# Patient Record
Sex: FEMALE | Race: WHITE | NOT HISPANIC OR LATINO | Employment: OTHER | URBAN - METROPOLITAN AREA
[De-identification: names, ages, dates, MRNs, and addresses within clinical notes are randomized per-mention and may not be internally consistent; named-entity substitution may affect disease eponyms.]

---

## 2023-06-11 ENCOUNTER — APPOINTMENT (OUTPATIENT)
Dept: CARDIOLOGY | Facility: HOSPITAL | Age: 78
End: 2023-06-11
Payer: MEDICARE

## 2023-06-11 ENCOUNTER — APPOINTMENT (OUTPATIENT)
Dept: OTHER | Facility: HOSPITAL | Age: 78
End: 2023-06-11
Payer: MEDICARE

## 2023-06-11 ENCOUNTER — APPOINTMENT (OUTPATIENT)
Dept: GENERAL RADIOLOGY | Facility: HOSPITAL | Age: 78
End: 2023-06-11
Payer: MEDICARE

## 2023-06-11 ENCOUNTER — HOSPITAL ENCOUNTER (INPATIENT)
Facility: HOSPITAL | Age: 78
LOS: 3 days | Discharge: HOME | End: 2023-06-14
Attending: INTERNAL MEDICINE | Admitting: INTERNAL MEDICINE
Payer: MEDICARE

## 2023-06-11 ENCOUNTER — APPOINTMENT (OUTPATIENT)
Dept: CT IMAGING | Facility: HOSPITAL | Age: 78
End: 2023-06-11
Payer: MEDICARE

## 2023-06-11 DIAGNOSIS — I26.99 ACUTE MASSIVE PULMONARY EMBOLISM: ICD-10-CM

## 2023-06-11 DIAGNOSIS — I27.20 PULMONARY HYPERTENSION: Primary | ICD-10-CM

## 2023-06-11 PROBLEM — I10 ESSENTIAL HYPERTENSION: Status: ACTIVE | Noted: 2023-06-11

## 2023-06-11 PROBLEM — E03.9 HYPOTHYROIDISM (ACQUIRED): Status: ACTIVE | Noted: 2023-06-11

## 2023-06-11 PROBLEM — J45.909 ASTHMA: Status: ACTIVE | Noted: 2023-06-11

## 2023-06-11 LAB
ABO GROUP BLD: NORMAL
ABO GROUP BLD: NORMAL
ALBUMIN SERPL-MCNC: 3.4 G/DL (ref 3.5–5.2)
ALBUMIN/GLOB SERPL: 1.5 G/DL
ALP SERPL-CCNC: 64 U/L (ref 39–117)
ALT SERPL W P-5'-P-CCNC: 28 U/L (ref 1–33)
ANION GAP SERPL CALCULATED.3IONS-SCNC: 7 MMOL/L (ref 5–15)
APTT PPP: 47.6 SECONDS (ref 60–90)
ARTERIAL PATENCY WRIST A: ABNORMAL
AST SERPL-CCNC: 20 U/L (ref 1–32)
ATMOSPHERIC PRESS: ABNORMAL MM[HG]
BASE EXCESS BLDA CALC-SCNC: -0.3 MMOL/L (ref 0–2)
BASOPHILS # BLD AUTO: 0.04 10*3/MM3 (ref 0–0.2)
BASOPHILS NFR BLD AUTO: 0.4 % (ref 0–1.5)
BDY SITE: ABNORMAL
BH CV ECHO MEAS - AO MAX PG: 2.9 MMHG
BH CV ECHO MEAS - AO MEAN PG: 2 MMHG
BH CV ECHO MEAS - AO ROOT DIAM: 3.3 CM
BH CV ECHO MEAS - AO V2 MAX: 85.6 CM/SEC
BH CV ECHO MEAS - AO V2 VTI: 16.8 CM
BH CV ECHO MEAS - AVA(I,D): 2.8 CM2
BH CV ECHO MEAS - EDV(CUBED): 97.3 ML
BH CV ECHO MEAS - EDV(MOD-SP2): 60.2 ML
BH CV ECHO MEAS - EDV(MOD-SP4): 63.2 ML
BH CV ECHO MEAS - EF(MOD-BP): 50.1 %
BH CV ECHO MEAS - EF(MOD-SP2): 48.7 %
BH CV ECHO MEAS - EF(MOD-SP4): 50.6 %
BH CV ECHO MEAS - ESV(CUBED): 64 ML
BH CV ECHO MEAS - ESV(MOD-SP2): 30.9 ML
BH CV ECHO MEAS - ESV(MOD-SP4): 31.2 ML
BH CV ECHO MEAS - FS: 13 %
BH CV ECHO MEAS - IVS/LVPW: 0.73 CM
BH CV ECHO MEAS - IVSD: 0.8 CM
BH CV ECHO MEAS - LA DIMENSION: 2.8 CM
BH CV ECHO MEAS - LAT PEAK E' VEL: 6.2 CM/SEC
BH CV ECHO MEAS - LV MASS(C)D: 148.1 GRAMS
BH CV ECHO MEAS - LV MAX PG: 2.47 MMHG
BH CV ECHO MEAS - LV MEAN PG: 1 MMHG
BH CV ECHO MEAS - LV V1 MAX: 78.6 CM/SEC
BH CV ECHO MEAS - LV V1 VTI: 14.8 CM
BH CV ECHO MEAS - LVIDD: 4.6 CM
BH CV ECHO MEAS - LVIDS: 4 CM
BH CV ECHO MEAS - LVOT AREA: 3.1 CM2
BH CV ECHO MEAS - LVOT DIAM: 2 CM
BH CV ECHO MEAS - LVPWD: 1.1 CM
BH CV ECHO MEAS - MED PEAK E' VEL: 4.7 CM/SEC
BH CV ECHO MEAS - MV A MAX VEL: 80.7 CM/SEC
BH CV ECHO MEAS - MV DEC TIME: 0.23 MSEC
BH CV ECHO MEAS - MV E MAX VEL: 43.5 CM/SEC
BH CV ECHO MEAS - MV E/A: 0.54
BH CV ECHO MEAS - PA ACC TIME: 0.15 SEC
BH CV ECHO MEAS - PA V2 MAX: 77 CM/SEC
BH CV ECHO MEAS - RAP SYSTOLE: 8 MMHG
BH CV ECHO MEAS - RVSP: 58 MMHG
BH CV ECHO MEAS - SV(LVOT): 46.5 ML
BH CV ECHO MEAS - SV(MOD-SP2): 29.3 ML
BH CV ECHO MEAS - SV(MOD-SP4): 32 ML
BH CV ECHO MEAS - TAPSE (>1.6): 1.57 CM
BH CV ECHO MEAS - TR MAX PG: 50 MMHG
BH CV ECHO MEAS - TR MAX VEL: 353 CM/SEC
BH CV ECHO MEAS RV FREE WALL STRAIN: -18.6 %
BH CV ECHO MEASUREMENTS AVERAGE E/E' RATIO: 7.98
BH CV LOW VAS LEFT PERONEAL VESSEL: 1
BH CV LOW VAS RIGHT PERONEAL VESSEL: 1
BH CV LOWER VASCULAR LEFT COMMON FEMORAL AUGMENT: NORMAL
BH CV LOWER VASCULAR LEFT COMMON FEMORAL COMPRESS: NORMAL
BH CV LOWER VASCULAR LEFT COMMON FEMORAL PHASIC: NORMAL
BH CV LOWER VASCULAR LEFT COMMON FEMORAL SPONT: NORMAL
BH CV LOWER VASCULAR LEFT DISTAL FEMORAL COMPRESS: NORMAL
BH CV LOWER VASCULAR LEFT GASTRONEMIUS COMPRESS: NORMAL
BH CV LOWER VASCULAR LEFT GREATER SAPH AK COMPRESS: NORMAL
BH CV LOWER VASCULAR LEFT GREATER SAPH BK COMPRESS: NORMAL
BH CV LOWER VASCULAR LEFT LESSER SAPH COMPRESS: NORMAL
BH CV LOWER VASCULAR LEFT MID FEMORAL AUGMENT: NORMAL
BH CV LOWER VASCULAR LEFT MID FEMORAL COMPRESS: NORMAL
BH CV LOWER VASCULAR LEFT MID FEMORAL PHASIC: NORMAL
BH CV LOWER VASCULAR LEFT MID FEMORAL SPONT: NORMAL
BH CV LOWER VASCULAR LEFT PERONEAL COMPRESS: NORMAL
BH CV LOWER VASCULAR LEFT PERONEAL THROMBUS: NORMAL
BH CV LOWER VASCULAR LEFT POPLITEAL AUGMENT: NORMAL
BH CV LOWER VASCULAR LEFT POPLITEAL COMPRESS: NORMAL
BH CV LOWER VASCULAR LEFT POPLITEAL PHASIC: NORMAL
BH CV LOWER VASCULAR LEFT POPLITEAL SPONT: NORMAL
BH CV LOWER VASCULAR LEFT POSTERIOR TIBIAL COMPRESS: NORMAL
BH CV LOWER VASCULAR LEFT PROFUNDA FEMORAL COMPRESS: NORMAL
BH CV LOWER VASCULAR LEFT PROXIMAL FEMORAL COMPRESS: NORMAL
BH CV LOWER VASCULAR LEFT SAPHENOFEMORAL JUNCTION COMPRESS: NORMAL
BH CV LOWER VASCULAR RIGHT COMMON FEMORAL AUGMENT: NORMAL
BH CV LOWER VASCULAR RIGHT COMMON FEMORAL COMPRESS: NORMAL
BH CV LOWER VASCULAR RIGHT COMMON FEMORAL PHASIC: NORMAL
BH CV LOWER VASCULAR RIGHT COMMON FEMORAL SPONT: NORMAL
BH CV LOWER VASCULAR RIGHT DISTAL FEMORAL COMPRESS: NORMAL
BH CV LOWER VASCULAR RIGHT GASTRONEMIUS COMPRESS: NORMAL
BH CV LOWER VASCULAR RIGHT GREATER SAPH AK COMPRESS: NORMAL
BH CV LOWER VASCULAR RIGHT GREATER SAPH BK COMPRESS: NORMAL
BH CV LOWER VASCULAR RIGHT LESSER SAPH COMPRESS: NORMAL
BH CV LOWER VASCULAR RIGHT MID FEMORAL AUGMENT: NORMAL
BH CV LOWER VASCULAR RIGHT MID FEMORAL COMPRESS: NORMAL
BH CV LOWER VASCULAR RIGHT MID FEMORAL PHASIC: NORMAL
BH CV LOWER VASCULAR RIGHT MID FEMORAL SPONT: NORMAL
BH CV LOWER VASCULAR RIGHT PERONEAL COMPRESS: NORMAL
BH CV LOWER VASCULAR RIGHT PERONEAL THROMBUS: NORMAL
BH CV LOWER VASCULAR RIGHT POPLITEAL AUGMENT: NORMAL
BH CV LOWER VASCULAR RIGHT POPLITEAL COMPRESS: NORMAL
BH CV LOWER VASCULAR RIGHT POPLITEAL PHASIC: NORMAL
BH CV LOWER VASCULAR RIGHT POPLITEAL SPONT: NORMAL
BH CV LOWER VASCULAR RIGHT POSTERIOR TIBIAL COMPRESS: NORMAL
BH CV LOWER VASCULAR RIGHT PROFUNDA FEMORAL COMPRESS: NORMAL
BH CV LOWER VASCULAR RIGHT PROXIMAL FEMORAL COMPRESS: NORMAL
BH CV LOWER VASCULAR RIGHT SAPHENOFEMORAL JUNCTION COMPRESS: NORMAL
BH CV VAS PRELIMINARY FINDINGS SCRIPTING: 1
BH CV XLRA - RV BASE: 4.6 CM
BH CV XLRA - RV LENGTH: 5.3 CM
BH CV XLRA - RV MID: 3.6 CM
BH CV XLRA - TDI S': 7.4 CM/SEC
BILIRUB SERPL-MCNC: 0.3 MG/DL (ref 0–1.2)
BLD GP AB SCN SERPL QL: NEGATIVE
BODY TEMPERATURE: 37 C
BUN SERPL-MCNC: 21 MG/DL (ref 8–23)
BUN/CREAT SERPL: 34.4 (ref 7–25)
CALCIUM SPEC-SCNC: 8.3 MG/DL (ref 8.6–10.5)
CHLORIDE SERPL-SCNC: 112 MMOL/L (ref 98–107)
CO2 BLDA-SCNC: 26.1 MMOL/L (ref 22–33)
CO2 SERPL-SCNC: 26 MMOL/L (ref 22–29)
COHGB MFR BLD: 1.1 % (ref 0–2)
CREAT SERPL-MCNC: 0.61 MG/DL (ref 0.57–1)
D-LACTATE SERPL-SCNC: 1.2 MMOL/L (ref 0.5–2)
DEPRECATED RDW RBC AUTO: 43 FL (ref 37–54)
EGFRCR SERPLBLD CKD-EPI 2021: 91.6 ML/MIN/1.73
EOSINOPHIL # BLD AUTO: 0.13 10*3/MM3 (ref 0–0.4)
EOSINOPHIL NFR BLD AUTO: 1.4 % (ref 0.3–6.2)
EPAP: 0
ERYTHROCYTE [DISTWIDTH] IN BLOOD BY AUTOMATED COUNT: 12.9 % (ref 12.3–15.4)
GEN 5 2HR TROPONIN T REFLEX: 17 NG/L
GLOBULIN UR ELPH-MCNC: 2.3 GM/DL
GLUCOSE BLDC GLUCOMTR-MCNC: 129 MG/DL (ref 70–130)
GLUCOSE BLDC GLUCOMTR-MCNC: 95 MG/DL (ref 70–130)
GLUCOSE SERPL-MCNC: 101 MG/DL (ref 65–99)
HBA1C MFR BLD: 6 % (ref 4.8–5.6)
HCO3 BLDA-SCNC: 24.8 MMOL/L (ref 20–26)
HCT VFR BLD AUTO: 36.1 % (ref 34–46.6)
HCT VFR BLD CALC: 36.2 % (ref 38–51)
HGB BLD-MCNC: 11.2 G/DL (ref 12–15.9)
HGB BLDA-MCNC: 11.8 G/DL (ref 14–18)
IMM GRANULOCYTES # BLD AUTO: 0.02 10*3/MM3 (ref 0–0.05)
IMM GRANULOCYTES NFR BLD AUTO: 0.2 % (ref 0–0.5)
INHALED O2 CONCENTRATION: 32 %
INR PPP: 1.29 (ref 0.89–1.12)
IPAP: 0
LEFT ATRIUM VOLUME INDEX: 28.3 ML/M2
LYMPHOCYTES # BLD AUTO: 2.59 10*3/MM3 (ref 0.7–3.1)
LYMPHOCYTES NFR BLD AUTO: 27.7 % (ref 19.6–45.3)
MAGNESIUM SERPL-MCNC: 1.9 MG/DL (ref 1.6–2.4)
MCH RBC QN AUTO: 28.5 PG (ref 26.6–33)
MCHC RBC AUTO-ENTMCNC: 31 G/DL (ref 31.5–35.7)
MCV RBC AUTO: 91.9 FL (ref 79–97)
METHGB BLD QL: 0.4 % (ref 0–1.5)
MODALITY: ABNORMAL
MONOCYTES # BLD AUTO: 0.77 10*3/MM3 (ref 0.1–0.9)
MONOCYTES NFR BLD AUTO: 8.2 % (ref 5–12)
NEUTROPHILS NFR BLD AUTO: 5.8 10*3/MM3 (ref 1.7–7)
NEUTROPHILS NFR BLD AUTO: 62.1 % (ref 42.7–76)
NOTE: ABNORMAL
NRBC BLD AUTO-RTO: 0 /100 WBC (ref 0–0.2)
OXYHGB MFR BLDV: 95.3 % (ref 94–99)
PAW @ PEAK INSP FLOW SETTING VENT: 0 CMH2O
PCO2 BLDA: 41.4 MM HG (ref 35–45)
PCO2 TEMP ADJ BLD: 41.4 MM HG (ref 35–45)
PH BLDA: 7.38 PH UNITS (ref 7.35–7.45)
PH, TEMP CORRECTED: 7.38 PH UNITS
PHOSPHATE SERPL-MCNC: 3.3 MG/DL (ref 2.5–4.5)
PLATELET # BLD AUTO: 186 10*3/MM3 (ref 140–450)
PMV BLD AUTO: 10 FL (ref 6–12)
PO2 BLDA: 84.1 MM HG (ref 83–108)
PO2 TEMP ADJ BLD: 84.1 MM HG (ref 83–108)
POTASSIUM SERPL-SCNC: 3.9 MMOL/L (ref 3.5–5.2)
PROT SERPL-MCNC: 5.7 G/DL (ref 6–8.5)
PROTHROMBIN TIME: 16.2 SECONDS (ref 12.2–14.5)
RBC # BLD AUTO: 3.93 10*6/MM3 (ref 3.77–5.28)
RH BLD: POSITIVE
RH BLD: POSITIVE
SODIUM SERPL-SCNC: 145 MMOL/L (ref 136–145)
T&S EXPIRATION DATE: NORMAL
TOTAL RATE: 0 BREATHS/MINUTE
TROPONIN T DELTA: -2 NG/L
TROPONIN T SERPL HS-MCNC: 19 NG/L
TSH SERPL DL<=0.05 MIU/L-ACNC: 2.34 UIU/ML (ref 0.27–4.2)
UFH PPP CHRO-ACNC: 0.26 IU/ML (ref 0.3–0.7)
UFH PPP CHRO-ACNC: 0.26 IU/ML (ref 0.3–0.7)
WBC NRBC COR # BLD: 9.35 10*3/MM3 (ref 3.4–10.8)

## 2023-06-11 PROCEDURE — 85025 COMPLETE CBC W/AUTO DIFF WBC: CPT | Performed by: INTERNAL MEDICINE

## 2023-06-11 PROCEDURE — 84484 ASSAY OF TROPONIN QUANT: CPT | Performed by: INTERNAL MEDICINE

## 2023-06-11 PROCEDURE — 80053 COMPREHEN METABOLIC PANEL: CPT | Performed by: INTERNAL MEDICINE

## 2023-06-11 PROCEDURE — 85520 HEPARIN ASSAY: CPT | Performed by: INTERNAL MEDICINE

## 2023-06-11 PROCEDURE — 93970 EXTREMITY STUDY: CPT

## 2023-06-11 PROCEDURE — 0 MAGNESIUM SULFATE 4 GM/100ML SOLUTION: Performed by: INTERNAL MEDICINE

## 2023-06-11 PROCEDURE — 93005 ELECTROCARDIOGRAM TRACING: CPT | Performed by: INTERNAL MEDICINE

## 2023-06-11 PROCEDURE — 36600 WITHDRAWAL OF ARTERIAL BLOOD: CPT

## 2023-06-11 PROCEDURE — 83050 HGB METHEMOGLOBIN QUAN: CPT

## 2023-06-11 PROCEDURE — 82948 REAGENT STRIP/BLOOD GLUCOSE: CPT

## 2023-06-11 PROCEDURE — 83605 ASSAY OF LACTIC ACID: CPT | Performed by: INTERNAL MEDICINE

## 2023-06-11 PROCEDURE — 83735 ASSAY OF MAGNESIUM: CPT | Performed by: INTERNAL MEDICINE

## 2023-06-11 PROCEDURE — 86901 BLOOD TYPING SEROLOGIC RH(D): CPT

## 2023-06-11 PROCEDURE — 84100 ASSAY OF PHOSPHORUS: CPT | Performed by: INTERNAL MEDICINE

## 2023-06-11 PROCEDURE — 86900 BLOOD TYPING SEROLOGIC ABO: CPT | Performed by: INTERNAL MEDICINE

## 2023-06-11 PROCEDURE — 82805 BLOOD GASES W/O2 SATURATION: CPT

## 2023-06-11 PROCEDURE — 93970 EXTREMITY STUDY: CPT | Performed by: INTERNAL MEDICINE

## 2023-06-11 PROCEDURE — 71045 X-RAY EXAM CHEST 1 VIEW: CPT

## 2023-06-11 PROCEDURE — 99223 1ST HOSP IP/OBS HIGH 75: CPT | Performed by: INTERNAL MEDICINE

## 2023-06-11 PROCEDURE — 82375 ASSAY CARBOXYHB QUANT: CPT

## 2023-06-11 PROCEDURE — 84443 ASSAY THYROID STIM HORMONE: CPT | Performed by: INTERNAL MEDICINE

## 2023-06-11 PROCEDURE — 86901 BLOOD TYPING SEROLOGIC RH(D): CPT | Performed by: INTERNAL MEDICINE

## 2023-06-11 PROCEDURE — 85520 HEPARIN ASSAY: CPT

## 2023-06-11 PROCEDURE — 86850 RBC ANTIBODY SCREEN: CPT | Performed by: INTERNAL MEDICINE

## 2023-06-11 PROCEDURE — 85610 PROTHROMBIN TIME: CPT | Performed by: INTERNAL MEDICINE

## 2023-06-11 PROCEDURE — 93306 TTE W/DOPPLER COMPLETE: CPT

## 2023-06-11 PROCEDURE — 83036 HEMOGLOBIN GLYCOSYLATED A1C: CPT | Performed by: INTERNAL MEDICINE

## 2023-06-11 PROCEDURE — 85730 THROMBOPLASTIN TIME PARTIAL: CPT | Performed by: INTERNAL MEDICINE

## 2023-06-11 PROCEDURE — 86900 BLOOD TYPING SEROLOGIC ABO: CPT

## 2023-06-11 PROCEDURE — 25010000002 HEPARIN (PORCINE) 25000-0.45 UT/250ML-% SOLUTION: Performed by: INTERNAL MEDICINE

## 2023-06-11 RX ORDER — SODIUM CHLORIDE 9 MG/ML
40 INJECTION, SOLUTION INTRAVENOUS AS NEEDED
Status: DISCONTINUED | OUTPATIENT
Start: 2023-06-11 | End: 2023-06-12

## 2023-06-11 RX ORDER — SODIUM CHLORIDE 0.9 % (FLUSH) 0.9 %
10 SYRINGE (ML) INJECTION AS NEEDED
Status: DISCONTINUED | OUTPATIENT
Start: 2023-06-11 | End: 2023-06-14

## 2023-06-11 RX ORDER — BISACODYL 5 MG/1
5 TABLET, DELAYED RELEASE ORAL DAILY PRN
Status: DISCONTINUED | OUTPATIENT
Start: 2023-06-11 | End: 2023-06-12

## 2023-06-11 RX ORDER — ATORVASTATIN CALCIUM 40 MG/1
40 TABLET, FILM COATED ORAL NIGHTLY
COMMUNITY

## 2023-06-11 RX ORDER — PAROXETINE 10 MG/1
10 TABLET, FILM COATED ORAL DAILY
Status: DISCONTINUED | OUTPATIENT
Start: 2023-06-12 | End: 2023-06-15 | Stop reason: HOSPADM

## 2023-06-11 RX ORDER — HEPARIN SODIUM 10000 [USP'U]/100ML
12 INJECTION, SOLUTION INTRAVENOUS
Status: DISCONTINUED | OUTPATIENT
Start: 2023-06-11 | End: 2023-06-12

## 2023-06-11 RX ORDER — ONDANSETRON 2 MG/ML
4 INJECTION INTRAMUSCULAR; INTRAVENOUS EVERY 6 HOURS PRN
Status: DISCONTINUED | OUTPATIENT
Start: 2023-06-11 | End: 2023-06-14

## 2023-06-11 RX ORDER — NICOTINE POLACRILEX 4 MG
15 LOZENGE BUCCAL
Status: DISCONTINUED | OUTPATIENT
Start: 2023-06-11 | End: 2023-06-12

## 2023-06-11 RX ORDER — PAROXETINE 10 MG/1
10 TABLET, FILM COATED ORAL EVERY MORNING
COMMUNITY

## 2023-06-11 RX ORDER — SODIUM CHLORIDE 0.9 % (FLUSH) 0.9 %
10 SYRINGE (ML) INJECTION EVERY 12 HOURS SCHEDULED
Status: DISCONTINUED | OUTPATIENT
Start: 2023-06-11 | End: 2023-06-14

## 2023-06-11 RX ORDER — ATORVASTATIN CALCIUM 40 MG/1
40 TABLET, FILM COATED ORAL NIGHTLY
Status: DISCONTINUED | OUTPATIENT
Start: 2023-06-11 | End: 2023-06-15 | Stop reason: HOSPADM

## 2023-06-11 RX ORDER — ERGOCALCIFEROL (VITAMIN D2) 10 MCG
400 TABLET ORAL DAILY
COMMUNITY

## 2023-06-11 RX ORDER — MAGNESIUM SULFATE HEPTAHYDRATE 40 MG/ML
4 INJECTION, SOLUTION INTRAVENOUS ONCE
Status: COMPLETED | OUTPATIENT
Start: 2023-06-11 | End: 2023-06-11

## 2023-06-11 RX ORDER — DOCUSATE SODIUM 250 MG
250 CAPSULE ORAL DAILY
COMMUNITY

## 2023-06-11 RX ORDER — TRAZODONE HYDROCHLORIDE 50 MG/1
50 TABLET ORAL NIGHTLY
COMMUNITY

## 2023-06-11 RX ORDER — LEVOTHYROXINE SODIUM 0.1 MG/1
100 TABLET ORAL
Status: DISCONTINUED | OUTPATIENT
Start: 2023-06-12 | End: 2023-06-15 | Stop reason: HOSPADM

## 2023-06-11 RX ORDER — POLYETHYLENE GLYCOL 3350 17 G/17G
17 POWDER, FOR SOLUTION ORAL DAILY PRN
Status: DISCONTINUED | OUTPATIENT
Start: 2023-06-11 | End: 2023-06-12

## 2023-06-11 RX ORDER — DEXTROSE MONOHYDRATE 25 G/50ML
10-50 INJECTION, SOLUTION INTRAVENOUS
Status: DISCONTINUED | OUTPATIENT
Start: 2023-06-11 | End: 2023-06-12

## 2023-06-11 RX ORDER — AMOXICILLIN 250 MG
2 CAPSULE ORAL 2 TIMES DAILY
Status: DISCONTINUED | OUTPATIENT
Start: 2023-06-11 | End: 2023-06-13

## 2023-06-11 RX ORDER — TRAZODONE HYDROCHLORIDE 50 MG/1
25 TABLET ORAL NIGHTLY
Status: DISCONTINUED | OUTPATIENT
Start: 2023-06-11 | End: 2023-06-14

## 2023-06-11 RX ORDER — BISACODYL 10 MG
10 SUPPOSITORY, RECTAL RECTAL DAILY PRN
Status: DISCONTINUED | OUTPATIENT
Start: 2023-06-11 | End: 2023-06-12

## 2023-06-11 RX ORDER — INSULIN LISPRO 100 [IU]/ML
1-200 INJECTION, SOLUTION INTRAVENOUS; SUBCUTANEOUS
Status: DISCONTINUED | OUTPATIENT
Start: 2023-06-11 | End: 2023-06-12

## 2023-06-11 RX ORDER — LEVOTHYROXINE SODIUM 0.1 MG/1
100 TABLET ORAL
COMMUNITY

## 2023-06-11 RX ORDER — NITROGLYCERIN 0.4 MG/1
0.4 TABLET SUBLINGUAL
Status: DISCONTINUED | OUTPATIENT
Start: 2023-06-11 | End: 2023-06-12

## 2023-06-11 RX ORDER — INSULIN LISPRO 100 [IU]/ML
1-200 INJECTION, SOLUTION INTRAVENOUS; SUBCUTANEOUS AS NEEDED
Status: DISCONTINUED | OUTPATIENT
Start: 2023-06-11 | End: 2023-06-12

## 2023-06-11 RX ORDER — METFORMIN HYDROCHLORIDE 500 MG/1
500 TABLET, EXTENDED RELEASE ORAL
COMMUNITY

## 2023-06-11 RX ORDER — IBUPROFEN 600 MG/1
1 TABLET ORAL
Status: DISCONTINUED | OUTPATIENT
Start: 2023-06-11 | End: 2023-06-12

## 2023-06-11 RX ORDER — NAPROXEN SODIUM 220 MG
220 TABLET ORAL 2 TIMES DAILY PRN
COMMUNITY
End: 2023-06-14 | Stop reason: HOSPADM

## 2023-06-11 RX ADMIN — TRAZODONE HYDROCHLORIDE 25 MG: 50 TABLET ORAL at 21:53

## 2023-06-11 RX ADMIN — MAGNESIUM SULFATE HEPTAHYDRATE 4 G: 40 INJECTION, SOLUTION INTRAVENOUS at 15:43

## 2023-06-11 RX ADMIN — ATORVASTATIN CALCIUM 40 MG: 40 TABLET, FILM COATED ORAL at 21:52

## 2023-06-11 RX ADMIN — HEPARIN SODIUM 9 UNITS/KG/HR: 10000 INJECTION, SOLUTION INTRAVENOUS at 13:28

## 2023-06-11 RX ADMIN — Medication 10 ML: at 13:44

## 2023-06-11 RX ADMIN — SENNOSIDES AND DOCUSATE SODIUM 2 TABLET: 50; 8.6 TABLET ORAL at 20:10

## 2023-06-11 RX ADMIN — Medication 10 ML: at 20:10

## 2023-06-11 NOTE — PLAN OF CARE
Goal Outcome Evaluation:                      *admitted from [facility]  *heparin gtt infusing  *labs redrawn and sent   *Cards consulted  VSS, on 3L NC, afebrile   *c/o rib cage soreness  *family not present, on way

## 2023-06-11 NOTE — H&P
Intensive Care Admission Note     Acute massive pulmonary embolism    History of Present Illness     This a very nice 77-year-old patient with history of hypertension, hypothyroidism, and possible underlying asthma who had been in her usual state of health and is actually visiting [place] from [place] who was witnessed to have a syncopal episode yesterday by family.  It is unclear if she actually lost pulse or not however she did receive approximately 1 round of chest compressions from her family.  She regained consciousness and was brought to [facility] in [place], [place],.  She was found to have large bilateral pulmonary emboli without saddle formation and concern for right heart strain.  Our facility was consulted for transfer for possible endovascular thrombolytic versus thromboembolectomy.  The patient has been treated with heparin.  She arrives to our facility with normal blood pressure and pulse currently.  She is requiring only several liters of nasal cannula oxygen.  She currently denies any chest pain and feels only mildly short of breath.  She denies any abdominal discomfort.  She has denied any recent lower extremity edema or swelling.  She denies visual change, headache, or focal weakness.  She states that she has never had a history of blood clots in the past nor does she know of any in the family.  She has been on a long road trip recently from [place].  There have been no medication changes recently.  She denies any trauma.  She does state that she is a little sore in her right flank and she feels that this is from the chest compressions she received yesterday.    Problem List, Surgical History, Family, Social History, and ROS     Acute massive pulmonary embolism    Essential hypertension    Hypothyroidism (acquired)    Asthma     Past Surgical History:   Procedure Laterality Date    BLADDER SUSPENSION      x2    CHOLECYSTECTOMY      HERNIA REPAIR      HYSTERECTOMY      TUBAL ABDOMINAL  "LIGATION         No Known Allergies  No current facility-administered medications on file prior to encounter.     Current Outpatient Medications on File Prior to Encounter   Medication Sig    atorvastatin (LIPITOR) 40 MG tablet Take 1 tablet by mouth Every Night.    levothyroxine (SYNTHROID, LEVOTHROID) 100 MCG tablet Take 1 tablet by mouth Every Morning.    metFORMIN ER (GLUCOPHAGE-XR) 500 MG 24 hr tablet Take 1 tablet by mouth Daily With Breakfast.    PARoxetine (PAXIL) 10 MG tablet Take 1 tablet by mouth Every Morning.    traZODone (DESYREL) 50 MG tablet Take 1 tablet by mouth Every Night.    docusate sodium (COLACE) 250 MG capsule Take 1 capsule by mouth Daily.    naproxen sodium (ALEVE) 220 MG tablet Take 1 tablet by mouth 2 (Two) Times a Day As Needed.    Vitamin D, Cholecalciferol, (CHOLECALCIFEROL) 10 MCG (400 UNIT) tablet Take 1 tablet by mouth Daily.     MEDICATION LIST AND ALLERGIES REVIEWED.    History reviewed. No pertinent family history.  Social History     Tobacco Use    Smoking status: Never   Vaping Use    Vaping Use: Never used   Substance Use Topics    Alcohol use: Never    Drug use: Never         Review of Systems  A full review of systems has been completed and it is negative except as mentioned expressly in the HPI.    Physical Exam and Clinical Information   /97   Pulse 82   Temp 98.5 °F (36.9 °C) (Oral)   Resp 20   Ht 156.2 cm (61.5\")   Wt 76.8 kg (169 lb 5 oz)   SpO2 96%   BMI 31.47 kg/m²   Physical Exam  Constitutional:       General: She is not in acute distress.     Appearance: Normal appearance.   HENT:      Head: Normocephalic and atraumatic.      Mouth/Throat:      Mouth: Mucous membranes are moist.   Eyes:      Extraocular Movements: Extraocular movements intact.      Pupils: Pupils are equal, round, and reactive to light.   Neck:      Vascular: No carotid bruit.   Cardiovascular:      Rate and Rhythm: Normal rate and regular rhythm.      Pulses: Normal pulses.      " Heart sounds: Normal heart sounds. No murmur heard.  Pulmonary:      Effort: Pulmonary effort is normal. No respiratory distress.      Breath sounds: Normal breath sounds.   Abdominal:      General: Abdomen is flat. Bowel sounds are normal. There is no distension.   Musculoskeletal:         General: Normal range of motion.      Cervical back: Normal range of motion and neck supple. No tenderness.      Comments: There may be mild left greater than right ankle edema but otherwise no lower extremity edema is appreciated.  Pulses are intact.   Skin:     General: Skin is warm.      Capillary Refill: Capillary refill takes less than 2 seconds.      Coloration: Skin is not jaundiced or pale.      Findings: No bruising, erythema, lesion or rash.   Neurological:      General: No focal deficit present.      Mental Status: She is alert and oriented to person, place, and time.       Results from last 7 days   Lab Units 06/11/23  1322   WBC 10*3/mm3 9.35   HEMOGLOBIN g/dL 11.2*   PLATELETS 10*3/mm3 186     Results from last 7 days   Lab Units 06/11/23  1322 06/10/23  0923   SODIUM mmol/L 145  --    POTASSIUM mmol/L 3.9  --    CO2 mmol/L 26.0  --    BUN mg/dL 21  --    CREATININE mg/dL 0.61  --    MAGNESIUM mg/dL 1.9 1.7   PHOSPHORUS mg/dL 3.3  --    GLUCOSE mg/dL 101*  --      Estimated Creatinine Clearance: 72.1 mL/min (by C-G formula based on SCr of 0.61 mg/dL).  Results from last 7 days   Lab Units 06/11/23  1322   HEMOGLOBIN A1C % 6.00*     Results from last 7 days   Lab Units 06/11/23  1336   PH, ARTERIAL pH units 7.385   PCO2, ARTERIAL mm Hg 41.4   PO2 ART mm Hg 84.1     Lab Results   Component Value Date    LACTATE 1.2 06/11/2023          I reviewed the patient's results and images.     Impression     Acute massive pulmonary embolism    Essential hypertension    Hypothyroidism (acquired)    Asthma       Plan/Recommendations     The patient will be admitted to the intensive care unit for close monitoring.  Currently  hemodynamically stable.  Cardiology to consult for consideration of thromboembolectomy versus thrombolytic therapy.  Lower extremity duplex ultrasound as well as echocardiogram have been ordered.  I suspect that the patient may have had a provoked embolus from DVT from prolonged travel recently though she does not have a history in the past nor is there history in her family.  We will run a hypercoagulability panel.  Continue with heparin for now.    She will remain a full code.    High level of risk due to:  drug(s) requiring intensive monitoring for toxicity and decision regarding hospitalization or escalation of level of hospital care.        Shay Brooks MD, [facility]  Pulmonary and Critical Care Medicine  06/11/23 14:49 EDT     CC: Provider, No Known

## 2023-06-11 NOTE — NURSING NOTE
"  ACC REVIEW REPORT: [facility] [place] [facility]        PATIENT NAME: Cristal Reddy    PATIENT ID: [id number]        COVID-19 ACC SCREENING       DOES THE PATIENT HAVE A FEVER GREATER THAN OR EQUAL .4: NO    IS THE PATIENT EXPERIENCING SHORTNESS OF BREATH: YES    DOES THE PATIENT HAVE A COUGH: NO  DOES THE PATIENT HAVE ANY OF THE FOLLOWING RISK FACTORS:    EXPOSURE TO SUSPECTED OR KNOWN COVID-19: NO    RECENT TRAVEL HISTORY TO ENDEMIC AREA (DOMESTIC/LOCAL): NO    IS THE PATIENT A HEALTHCARE WORKER: NO    HAS THE PATIENT EXPERIENCED A LOSS OF SENSE OF TASTE OR SMELL: NO    HAS THE PATIENT BEEN TESTED FOR COVID-19: YES    DATE TESTED: 6/10  NEGATIVE           BED:  N234     BED TYPE: ICU     BED GIVEN TO: ANNMARIE VU RN IN ED     TIME BED GIVEN: 0400    TODAY'S DATE: 6/11/2023    TRANSFER DATE: 6/11    ETA: UNK,: PENDING TRANSPORTATION     TRANSFERRING FACILITY: [facility]     TRANSFERRING FACILITY PHONE # : [phone]    TRANSFERRING MD: MINOO IN ED     DATE/TIME REQUEST RECEIVED: 6/11 0015    [facility] RN: COLBY HAWKINSN, CCRN, CSC     REPORT FROM: ANNMARIE VU RN     TIME REPORT TAKEN: 0400    DIAGNOSIS: ANGEL PE WITH RIGHT HEART STRAIN     REASON FOR TRANSFER TO [facility]: HIGHER LEVEL OF CARE, POSS THROMBOLYTIC THERAPY    TRANSPORTATION: GROUND     CLINICAL REASON FOR TRANSFER TO [facility]: PT IS FROM [place], CAME TO [place] [facility] FOR [place].  PRIOR TO CEREMONY, PT HAD SYNCOPAL EPISODE WITNESSED BY FAMILY WHO STARTED CPR, (GOT COMPRESSIONS FOR COUPLE MINS PER FAMILY)  PT REGAINED CONSCIOUSNESS BY THE TIME EMS ARRIVED.     ED DOC DISCUSSED CASE W DR. GARCIA WHO ACCEPTED PT.        CLINICAL INFORMATION    HEIGHT: 61\"    WEIGHT: 77.1KG    ALLERGIES: NKDA     INFECTIOUS DISEASE: NONE      VITAL SIGNS:    TIME: 0300  TEMP: AFEB   PULSE: 74 NSR   B/P: 99/60  (HAS BEEN IN 90'S THE 19HRS SHE'S BEEN IN ED)   RESP: 18 W SATS 94 ON 3L /  NURSE REPORTS SHE GETS WINDED WALKING TO BATHROOM         LAB INFORMATION: WBC 11.5 /  " "CR 0.74 /  TROP X 3 = 58 / 76/  62 /  PLTS 202 /  H&H NORMAL /  K+ 3.4 (REPLACED ORALLY)                                        BNP 4000 /   /  D-DIMER 6.08        MEDS/IV FLUIDS:   20G IN RT AC AND 20G IN LEFT HAND; / HEPARIN GTT AT 9U/KG/HR, PTT IS DUE AT 6AM /  ATIVAN 1MG PO /                                      ORAL KCL 40MEQ.        CARDIAC SYSTEM:    CHEST PAIN:  ON ARRIVAL TO ED BUT RESOLVED     RHYTHM: NSR     Is patient taking or has patient been given any drugs that could increase bleeding? NO    HEART CATH:  NO         RESPIRATORY SYSTEM:    LUNG SOUNDS: DIMINISHED IN ANGEL BASES, NO RALES     ABG RESULTS:  REPORTED AS NORMAL     OXYGEN: 3L/NC     O2 SAT: 94     IMAGING FINDINGS:  LARGE ANGEL PE (SENDING DISC W IMAGING)       RESPIRATORY STATUS:  GETS \"WINDED W EXERTION\"        CNS/MUSCULOSKELETAL    ALERT AND ORIENTED:      RENE COMA SCALE:    E: 4  M: 6  V: 5      CNS/MUSCULOSKELETAL NOTES: WALKED TO BATHROOM W ASST 1,  NORMALLY INDEPENDENT       GI//GY      ABDOMINAL PAIN: DENIES        GI//GY NOTES: VOIDED W/O DIFF       PAST MEDICAL HISTORY: ASTHMA / HLD /  HYPOTHRYROID /  SURGERY HX= MELECIO , HERNIA REPAIR, HYSTERECTOMY       ADDITIONAL NOTES: FAMILY WILL BE COMING, THEY ARE TRAVELLING IN RV.            Raiza Dumas RN  6/11/2023  04:13 EDT  "

## 2023-06-11 NOTE — CONSULTS
Cardiology Consult    Cristal Reddy  1945  [phone]  There is no work phone number on file.    06/11/23    DATE OF ADMISSION: 6/11/2023  [facility] 2A ICU    Provider, No Known  [facility] / [facility] [id number]  Referring Provider: Art Boyle,*     Reason for Consultation: bilateral PE    Problem List:  Bilateral PE  Recent prolonged car ride traveling, with several week history of shortness of breath and 2 episodes of syncope in the last week  CT chest 6/10/2023: Moderate to large bilateral pulmonary embolism with evidence of right heart strain-[facility]  Hypothyroidism  Hyperlipidemia  Pre-diabetes  Anxiety/depression      History reviewed. No pertinent past medical history.    History of Present Illness:   Cristal Reddy is a 77-year-old female with history of hyperlipidemia, prediabetes, and hypothyroidism who cardiology is asked to evaluate today for need for EKOS due to new diagnosis of bilateral PE with evidence of right heart strain on CT chest.  The patient was transferred to [facility] earlier today from [facility] where she presented there yesterday after a syncopal spell at home and a several week history of worsening progressive shortness of breath.  CT of the chest revealed a moderate to large bilateral pulmonary embolism with evidence of right heart strain.  She was noted to be hypoxic, and she was placed on a heparin drip and oxygen via nasal cannula prior to transfer.  The patient tells me that she recently traveled from [place] back to her home in [place] 6 weeks ago and had noticed over the past several weeks that she had become short of breath with doing her normal activities to the point that she had to sit down and rest several times.  It had began to worsen and last week, she had an episode of syncope while doing things around her house. She became dizzy, then passed out, urinating on herself. Her neighbor found her, and  she recovered to feeling normal after about an hour. She did not seek medical treatment. She traveled to [place], [place] a few days ago for her grandson's wedding, and yesterday morning, around 7:30 AM, she got up to walk to the bathroom, felt dizzy, and passed out again. Reportedly, she was not responding and her grand-daughter could not find a pulse. She started CPR for about 15 compressions, and gave two breaths before the patient awoke. EMS brought her to [facility] ED. Currently, she is having a LE duplex performed. Echo done just a few minutes ago. She is on a heparin gtt. She is on 2.5 L O2 with O2 sat 97%. No CP.       No Known Allergies    Prior to Admission Medications       Prescriptions Last Dose Informant Patient Reported? Taking?    atorvastatin (LIPITOR) 40 MG tablet 6/10/2023  Yes Yes    Take 1 tablet by mouth Every Night.    levothyroxine (SYNTHROID, LEVOTHROID) 100 MCG tablet 6/10/2023 Self Yes Yes    Take 1 tablet by mouth Every Morning.    metFORMIN ER (GLUCOPHAGE-XR) 500 MG 24 hr tablet 6/10/2023  Yes Yes    Take 1 tablet by mouth Daily With Breakfast.    PARoxetine (PAXIL) 10 MG tablet 6/10/2023  Yes Yes    Take 1 tablet by mouth Every Morning.    traZODone (DESYREL) 50 MG tablet 6/10/2023  Yes Yes    Take 1 tablet by mouth Every Night.    docusate sodium (COLACE) 250 MG capsule   Yes No    Take 1 capsule by mouth Daily.    naproxen sodium (ALEVE) 220 MG tablet   Yes No    Take 1 tablet by mouth 2 (Two) Times a Day As Needed.    Vitamin D, Cholecalciferol, (CHOLECALCIFEROL) 10 MCG (400 UNIT) tablet   Yes No    Take 1 tablet by mouth Daily.              Current Facility-Administered Medications:     sennosides-docusate (PERICOLACE) 8.6-50 MG per tablet 2 tablet, 2 tablet, Oral, BID **AND** polyethylene glycol (MIRALAX) packet 17 g, 17 g, Oral, Daily PRN **AND** bisacodyl (DULCOLAX) EC tablet 5 mg, 5 mg, Oral, Daily PRN **AND** bisacodyl (DULCOLAX) suppository 10 mg, 10 mg, Rectal, Daily PRN,  Shay Brooks MD    Calcium Replacement - Follow Nurse / BPA Driven Protocol, , Does not apply, PRCecilia MENA Matthew M, MD    heparin 42064 units/250 mL (100 units/mL) in 0.45 % NaCl infusion, 10 Units/kg/hr, Intravenous, Titrated, Roddy Wyatt PharmD, Last Rate: 7.68 mL/hr at 06/11/23 1448, 10 Units/kg/hr at 06/11/23 1448    Magnesium Cardiology Dose Replacement - Follow Nurse / BPA Driven Protocol, , Does not apply, PRCecilia MENA Matthew M, MD    magnesium sulfate 4g/100mL (PREMIX) infusion, 4 g, Intravenous, Once, Shay Brooks MD    nitroglycerin (NITROSTAT) SL tablet 0.4 mg, 0.4 mg, Sublingual, Q5 Min PRN, Shay Brooks MD    ondansetron (ZOFRAN) injection 4 mg, 4 mg, Intravenous, Q6H PRN, Shya Brooks MD    Pharmacy to Dose Heparin, , Does not apply, Continuous PRNCecilia Matthew M, MD    Phosphorus Replacement - Follow Nurse / BPA Driven Protocol, , Does not apply, Cecilia ALY Matthew M, MD    Potassium Replacement - Follow Nurse / BPA Driven Protocol, , Does not apply, Cecilia ALY Matthew M, MD    sodium chloride 0.9 % flush 10 mL, 10 mL, Intravenous, Q12H, Shay Brooks MD, 10 mL at 06/11/23 1344    sodium chloride 0.9 % flush 10 mL, 10 mL, Intravenous, PRNCecilia Matthew M, MD    sodium chloride 0.9 % infusion 40 mL, 40 mL, Intravenous, PRN, Shay Brooks MD    Social History     Socioeconomic History    Marital status:    Tobacco Use    Smoking status: Never   Vaping Use    Vaping Use: Never used   Substance and Sexual Activity    Alcohol use: Never    Drug use: Never    Sexual activity: Defer       History reviewed. No pertinent family history.    REVIEW OF SYSTEMS:   CONSTITUTIONAL:         No weight loss, fever, chills, + weakness + fatigue.   HEENT:                            No visual loss, blurred vision, double vision, yellow sclerae.                                             No hearing loss, congestion, sore throat.   SKIN:  "                               No rashes, urticaria, ulcers, sores.     RESPIRATORY:               No shortness of breath, hemoptysis, cough, sputum.   GI:                                     No anorexia, nausea, vomiting, diarrhea. No abdominal pain, melena.   :                                   No burning on urination, hematuria or increased frequency.  ENDOCRINE:                   No diaphoresis, cold or heat intolerance. No polyuria or polydipsia.   NEURO:                            No headache, dizziness, +syncope, - paralysis, ataxia, or parasthesias.                                            No change in bowel or bladder control. No history of CVA/TIA  MUSCULOSKELETAL:    No muscle, back pain, joint pain or stiffness.   HEMATOLOGY:               No anemia, bleeding, bruising. No history of DVT/PE.  PSYCH:                            No history of depression, anxiety    Vitals:    06/11/23 1300 06/11/23 1315 06/11/23 1345 06/11/23 1400   BP: 144/97 121/90 142/83 143/97   BP Location: Left arm      Patient Position: Lying      Pulse: 87 85 83 82   Resp: 20      Temp: 98.5 °F (36.9 °C)      TempSrc: Oral      SpO2: 97% 98% 97% 96%   Weight: 76.8 kg (169 lb 5 oz)      Height: 156.2 cm (61.5\")            Vital Sign Min/Max for last 24 hours  Temp  Min: 98.5 °F (36.9 °C)  Max: 98.5 °F (36.9 °C)   BP  Min: 121/90  Max: 144/97   Pulse  Min: 82  Max: 87   Resp  Min: 20  Max: 20   SpO2  Min: 96 %  Max: 98 %   Flow (L/min)  Min: 3  Max: 3    No intake or output data in the 24 hours ending 06/11/23 1455          Physical Exam:  GEN: Well nourished, Well- developed  No acute distress  HEENT: Normocephalic, Atraumatic, PERRLA, moist mucous membranes  NECK: supple, NO JVD, no thyromegaly, no lymphadenopathy  CARDIAC: S1S2  RRR no murmur, gallop, rub  LUNGS: Clear to ausculation, normal respiratory effort  ABDOMEN: Soft, nontender, normal bowel sounds  EXTREMITIES:No gross deformities,  No clubbing, cyanosis, or " edema  SKIN: Warm, dry  NEURO: No focal deficits  PSYCHIATRIC: Normal affect and mood      I personally viewed and interpreted the patient's EKG/Telemetry/lab data    Data:   Results from last 7 days   Lab Units 06/11/23  1322   WBC 10*3/mm3 9.35   HEMOGLOBIN g/dL 11.2*   HEMATOCRIT % 36.1   PLATELETS 10*3/mm3 186     Results from last 7 days   Lab Units 06/11/23  1322   SODIUM mmol/L 145   POTASSIUM mmol/L 3.9   CHLORIDE mmol/L 112*   CO2 mmol/L 26.0   BUN mg/dL 21   CREATININE mg/dL 0.61   GLUCOSE mg/dL 101*      Results from last 7 days   Lab Units 06/11/23  1322   HEMOGLOBIN A1C % 6.00*     Results from last 7 days   Lab Units 06/11/23  1322   TSH uIU/mL 2.340         Results from last 7 days   Lab Units 06/11/23  1309 06/10/23  2230 06/10/23  0923   PROTIME Seconds 16.2*  --  12.3   INR  1.29*  --  1.15   APTT seconds 47.6*   < > 21.3*    < > = values in this interval not displayed.     Results from last 7 days   Lab Units 06/11/23  1322   HSTROP T ng/L 19*             No intake or output data in the 24 hours ending 06/11/23 1455    Chest X-Ray:  Imaging Results (Last 24 Hours)       Procedure Component Value Units Date/Time    CT Outside Films [[id number]] Resulted: 06/11/23 1348     Updated: 06/11/23 1348    Narrative:      This procedure was auto-finalized with no dictation required.    XR Chest 1 View [[id number]] Collected: 06/11/23 1331     Updated: 06/11/23 1337    Narrative:        XR CHEST 1 VW    Date of Exam: 6/11/2023 1:17 PM EDT    Indication: Bilateral pulmonary emboli with findings of right heart strain    Comparison: Outside CT pulmonary angiogram report dated Sidra 10, 2023    FINDINGS:  There appear to be mild opacities in the left base. No other significant infiltrate is seen.  No pneumothorax or significant pleural effusion.  Heart size appears at the upper limits of normal. Mediastinal contour appears grossly normal. There is   atherosclerosis of the aortic arch.      Impression:      1.The  heart appears borderline enlarged.  2.Mild left basilar opacities which could represent mild atelectasis, infiltrate, or developing infarct.      Electronically Signed: Shay Serra    6/11/2023 1:34 PM EDT    Workstation ID: [id number]            Telemetry: NSR   EKG: pending         Assessment and Plan:     Bilateral PE:  CT chest from [facility] shows moderate to large bilateral pulmonary embolism with evidence of right heart strain.  We will follow-up on echocardiogram performed earlier today for further assessment of right heart strain and discussed with interventionalists if EKOS is needed.  Continue heparin drip      Electronically signed by FLORENCE sTai, 06/11/23, 2:55 PM EDT.    History and physical examination verified.  Agree with PAs note.    77-year-old white female with a history of hypothyroidism, hyperlipidemia, prediabetes, and anxiety depression who subsequently was admitted as a transfer from outside hospital secondary to bilateral pulmonary embolism.  We are asked to see the patient for potential treatment of her bilateral PEs.  The patient was in her usual state of health until approximately 6 weeks ago.  At that time she had driven in a car for approximately 22 hours as she went from [place] to [place].  Subsequently after that car ride, she had been noticing increasing shortness of breath while doing her normal activities.  Multiple times she had to sit down and relax in order to resume activities of daily living.  The episodes of shortness of breath occur start to occur at rest and progressed.  She suffered an syncopal episode last week while doing activities of daily living where she became dizzy and passed out and urinated on herself.  She was found by her neighbor and subsequently recovered without presenting to the emergency room.  She subsequently traveled again in a long car ride from [facility] to see her grandson got .  Yesterday a.m. around 7:30 in the  morning she got up to walk to the bathroom again feeling dizzy and passed out again.  She did undergo CPR for approximately 15 compressions with 2 breaths before awakening.  She was then subsequently transferred to [facility] ER.  In the ER, she was found to have a bilateral large PE burden and subsequently transferred here for further care.  Presently she complains of some chest discomfort with inspiration due to her CPR but no ongoing shortness of breath at rest.  No near-syncope or syncope.  No dizziness.    Family history, social history, and review of systems per PAs note.    Physical Exam blood pressure is 143/97 pulse is 80 respiratory rate is 20 temperature is 98.5 O2 saturation on 2 L 96%.  Constitutional: oriented to person, place, and time.  well-developed and well-nourished. No distress.   HENT: Normocephalic.   Eyes: Conjunctivae are normal. No scleral icterus.   Neck: Normal carotid pulses, no hepatojugular reflux and no JVD present. Carotid bruit is not present. No tracheal deviation, no edema and no erythema present. No thyromegaly present.   Cardiovascular: Regular rate and rhythm normal S1 and S2 there is an S4 no murmurs. PMI NL  Pulses: equal and symmetrical.   Pulmonary/Chest: Clear to Auscultation bilaterally with no rales or wheezes. Resp effort NL  Abdominal: Soft. Bowel sounds are normal. no distension and no mass. There is no hepatosplenomegaly. There is no tenderness. There is no rebound and no guarding. No aortic pulsations.  Musculoskeletal:  exhibits no edema, tenderness or deformity.   Neurological: is alert and oriented to person, place, and time.  Rest is nonfocal.    Skin: Skin is warm and dry. No rash noted. No diaphoretic. No cyanosis or erythema. No pallor. Nails show no clubbing.   Psychiatric: Normal mood and affect.Speech is normal and behavior is normal.    Laboratory data as per PAs note.  Troponin level 19.    Twelve-lead EKG shows normal sinus rhythm 80 bpm normal  HI, QRS and QT intervals.  No RV strain pattern and no S1Q3T3 changes.    Echocardiogram presently pending.    Impression/plan:    Bilateral pulmonary emboli resulting in syncopal episode x2 in a patient with prolonged immobility during very long car rides.  Heart rate, blood pressure, O2 saturations are all stable with no significant abnormalities noted.  Await echocardiogram evaluation of RV strain.  Agree with intravenous heparin for now.  Also agree with Doppler ultrasounds of her lower extremities for further assessment.  Would keep on bedrest due to the large clot burden.  Discussed case with intensivist and interventional cardiologist on-call.  Best plan at this point would be intravenous heparin and no IV tPA or interventional procedures.    I, Ranjan Yeboah MD, personally performed the services face to face as described and documented by the above named individual. I have made any necessary edits and it is both accurate and complete 6/11/2023  15:49 EDT

## 2023-06-12 LAB
ALBUMIN SERPL-MCNC: 3.1 G/DL (ref 3.5–5.2)
ALBUMIN/GLOB SERPL: 1.3 G/DL
ALP SERPL-CCNC: 62 U/L (ref 39–117)
ALT SERPL W P-5'-P-CCNC: 25 U/L (ref 1–33)
ANION GAP SERPL CALCULATED.3IONS-SCNC: 6 MMOL/L (ref 5–15)
AST SERPL-CCNC: 16 U/L (ref 1–32)
BASOPHILS # BLD AUTO: 0.03 10*3/MM3 (ref 0–0.2)
BASOPHILS NFR BLD AUTO: 0.4 % (ref 0–1.5)
BILIRUB SERPL-MCNC: 0.4 MG/DL (ref 0–1.2)
BUN SERPL-MCNC: 20 MG/DL (ref 8–23)
BUN/CREAT SERPL: 37.7 (ref 7–25)
CALCIUM SPEC-SCNC: 8.1 MG/DL (ref 8.6–10.5)
CHLORIDE SERPL-SCNC: 111 MMOL/L (ref 98–107)
CO2 SERPL-SCNC: 25 MMOL/L (ref 22–29)
CREAT SERPL-MCNC: 0.53 MG/DL (ref 0.57–1)
D DIMER PPP FEU-MCNC: 0.81 MCGFEU/ML (ref 0–0.78)
DEPRECATED RDW RBC AUTO: 40.3 FL (ref 37–54)
EGFRCR SERPLBLD CKD-EPI 2021: 94.8 ML/MIN/1.73
EOSINOPHIL # BLD AUTO: 0.14 10*3/MM3 (ref 0–0.4)
EOSINOPHIL NFR BLD AUTO: 1.7 % (ref 0.3–6.2)
ERYTHROCYTE [DISTWIDTH] IN BLOOD BY AUTOMATED COUNT: 12.5 % (ref 12.3–15.4)
F5 GENE MUT ANL BLD/T: NORMAL
FACTOR II, DNA ANALYSIS: NORMAL
GLOBULIN UR ELPH-MCNC: 2.3 GM/DL
GLUCOSE BLDC GLUCOMTR-MCNC: 101 MG/DL (ref 70–130)
GLUCOSE BLDC GLUCOMTR-MCNC: 110 MG/DL (ref 70–130)
GLUCOSE BLDC GLUCOMTR-MCNC: 126 MG/DL (ref 70–130)
GLUCOSE BLDC GLUCOMTR-MCNC: 147 MG/DL (ref 70–130)
GLUCOSE SERPL-MCNC: 99 MG/DL (ref 65–99)
HCT VFR BLD AUTO: 33.1 % (ref 34–46.6)
HCYS SERPL-MCNC: 7.8 UMOL/L (ref 0–15)
HGB BLD-MCNC: 10.7 G/DL (ref 12–15.9)
IMM GRANULOCYTES # BLD AUTO: 0.03 10*3/MM3 (ref 0–0.05)
IMM GRANULOCYTES NFR BLD AUTO: 0.4 % (ref 0–0.5)
LYMPHOCYTES # BLD AUTO: 2.18 10*3/MM3 (ref 0.7–3.1)
LYMPHOCYTES NFR BLD AUTO: 26.9 % (ref 19.6–45.3)
MAGNESIUM SERPL-MCNC: 2.3 MG/DL (ref 1.6–2.4)
MCH RBC QN AUTO: 28.7 PG (ref 26.6–33)
MCHC RBC AUTO-ENTMCNC: 32.3 G/DL (ref 31.5–35.7)
MCV RBC AUTO: 88.7 FL (ref 79–97)
MONOCYTES # BLD AUTO: 0.73 10*3/MM3 (ref 0.1–0.9)
MONOCYTES NFR BLD AUTO: 9 % (ref 5–12)
NEUTROPHILS NFR BLD AUTO: 5 10*3/MM3 (ref 1.7–7)
NEUTROPHILS NFR BLD AUTO: 61.6 % (ref 42.7–76)
NRBC BLD AUTO-RTO: 0 /100 WBC (ref 0–0.2)
PHOSPHATE SERPL-MCNC: 3.2 MG/DL (ref 2.5–4.5)
PLATELET # BLD AUTO: 179 10*3/MM3 (ref 140–450)
PMV BLD AUTO: 10.8 FL (ref 6–12)
POTASSIUM SERPL-SCNC: 3.7 MMOL/L (ref 3.5–5.2)
PROT SERPL-MCNC: 5.4 G/DL (ref 6–8.5)
QT INTERVAL: 398 MS
QTC INTERVAL: 459 MS
RBC # BLD AUTO: 3.73 10*6/MM3 (ref 3.77–5.28)
SODIUM SERPL-SCNC: 142 MMOL/L (ref 136–145)
TSH SERPL DL<=0.05 MIU/L-ACNC: 2.25 UIU/ML (ref 0.27–4.2)
UFH PPP CHRO-ACNC: 0.29 IU/ML (ref 0.3–0.7)
WBC NRBC COR # BLD: 8.11 10*3/MM3 (ref 3.4–10.8)

## 2023-06-12 PROCEDURE — 84100 ASSAY OF PHOSPHORUS: CPT | Performed by: INTERNAL MEDICINE

## 2023-06-12 PROCEDURE — 85520 HEPARIN ASSAY: CPT

## 2023-06-12 PROCEDURE — 84443 ASSAY THYROID STIM HORMONE: CPT | Performed by: NURSE PRACTITIONER

## 2023-06-12 PROCEDURE — 85732 THROMBOPLASTIN TIME PARTIAL: CPT | Performed by: INTERNAL MEDICINE

## 2023-06-12 PROCEDURE — 85025 COMPLETE CBC W/AUTO DIFF WBC: CPT | Performed by: INTERNAL MEDICINE

## 2023-06-12 PROCEDURE — 82948 REAGENT STRIP/BLOOD GLUCOSE: CPT

## 2023-06-12 PROCEDURE — 86147 CARDIOLIPIN ANTIBODY EA IG: CPT | Performed by: INTERNAL MEDICINE

## 2023-06-12 PROCEDURE — 85305 CLOT INHIBIT PROT S TOTAL: CPT | Performed by: INTERNAL MEDICINE

## 2023-06-12 PROCEDURE — 85300 ANTITHROMBIN III ACTIVITY: CPT | Performed by: INTERNAL MEDICINE

## 2023-06-12 PROCEDURE — 83090 ASSAY OF HOMOCYSTEINE: CPT | Performed by: INTERNAL MEDICINE

## 2023-06-12 PROCEDURE — 80053 COMPREHEN METABOLIC PANEL: CPT | Performed by: INTERNAL MEDICINE

## 2023-06-12 PROCEDURE — 85670 THROMBIN TIME PLASMA: CPT | Performed by: INTERNAL MEDICINE

## 2023-06-12 PROCEDURE — 85705 THROMBOPLASTIN INHIBITION: CPT | Performed by: INTERNAL MEDICINE

## 2023-06-12 PROCEDURE — 85613 RUSSELL VIPER VENOM DILUTED: CPT | Performed by: INTERNAL MEDICINE

## 2023-06-12 PROCEDURE — 85220 BLOOC CLOT FACTOR V TEST: CPT | Performed by: INTERNAL MEDICINE

## 2023-06-12 PROCEDURE — 85306 CLOT INHIBIT PROT S FREE: CPT | Performed by: INTERNAL MEDICINE

## 2023-06-12 PROCEDURE — 85303 CLOT INHIBIT PROT C ACTIVITY: CPT | Performed by: INTERNAL MEDICINE

## 2023-06-12 PROCEDURE — 85302 CLOT INHIBIT PROT C ANTIGEN: CPT | Performed by: INTERNAL MEDICINE

## 2023-06-12 PROCEDURE — 81240 F2 GENE: CPT | Performed by: INTERNAL MEDICINE

## 2023-06-12 PROCEDURE — 83735 ASSAY OF MAGNESIUM: CPT | Performed by: INTERNAL MEDICINE

## 2023-06-12 PROCEDURE — 86038 ANTINUCLEAR ANTIBODIES: CPT | Performed by: INTERNAL MEDICINE

## 2023-06-12 PROCEDURE — 81241 F5 GENE: CPT | Performed by: INTERNAL MEDICINE

## 2023-06-12 PROCEDURE — 85379 FIBRIN DEGRADATION QUANT: CPT | Performed by: INTERNAL MEDICINE

## 2023-06-12 RX ADMIN — Medication 10 ML: at 08:36

## 2023-06-12 RX ADMIN — Medication 10 ML: at 20:05

## 2023-06-12 RX ADMIN — APIXABAN 10 MG: 5 TABLET, FILM COATED ORAL at 10:15

## 2023-06-12 RX ADMIN — PAROXETINE HYDROCHLORIDE 10 MG: 10 TABLET, FILM COATED ORAL at 08:35

## 2023-06-12 RX ADMIN — TRAZODONE HYDROCHLORIDE 25 MG: 50 TABLET ORAL at 20:05

## 2023-06-12 RX ADMIN — ATORVASTATIN CALCIUM 40 MG: 40 TABLET, FILM COATED ORAL at 20:05

## 2023-06-12 RX ADMIN — SENNOSIDES AND DOCUSATE SODIUM 2 TABLET: 50; 8.6 TABLET ORAL at 20:05

## 2023-06-12 RX ADMIN — LEVOTHYROXINE SODIUM 100 MCG: 0.1 TABLET ORAL at 06:37

## 2023-06-12 RX ADMIN — APIXABAN 10 MG: 5 TABLET, FILM COATED ORAL at 20:05

## 2023-06-12 NOTE — PROGRESS NOTES
I counseled Mrs. Reddy at bedside about her new Eliquis prescription. Her nurse had already dropped off a medication information sheet but I also gave her our pharmacy copy. 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 States her sister is also on it so she is also familiar with the drug through her. We went over possible drug interactions (including grapefruit), what to do if she missed a dose, and signs of bleeding to watch for. Patient verbalized understanding via teachback and did not have any questions.    Thank you,  Howard Vega, Pharmacy Intern

## 2023-06-12 NOTE — PROGRESS NOTES
INTENSIVIST   PROGRESS NOTE        SUBJECTIVE     Cristal 77 y.o. female is followed for: No chief complaint on file.       Acute massive pulmonary embolism    Essential hypertension    Hypothyroidism (acquired)    Asthma    As an Intensivist, we provide an integrated approach to the ICU patient and family, medical management of comorbid conditions, including but not limited to electrolytes, glycemic control, organ dysfunction, lead interdisciplinary rounds and coordinate the care with all other services, including those from other specialists.     Interval History:  Overall doing well.  No significant dyspnea.  She is on LFNC    Device (Oxygen Therapy): nasal cannula (23): Flow (L/min): 1 (23).     Temp  Min: 97.5 °F (36.4 °C)  Max: 98.5 °F (36.9 °C)       History     Last Reviewed by Rangel Kumari MD on 2023 at  4:29 PM    Sections Reviewed    Medical, Family, Surgical, Tobacco, Alcohol, Drug Use, Sexual Activity,   Social Documentation    Problem list reviewed by Rangel Kumari MD on 2023 at  4:29 PM  Medicines reviewed by Rangel Kumari MD on 2023 at  4:29 PM  Allergies reviewed by Rangel Kumari MD on 2023 at  4:28 PM       The patient's relevant past medical, surgical and social history were reviewed and updated in Epic as appropriate.        OBJECTIVE     Vitals:  Temp: 98.2 °F (36.8 °C) (23) Temp  Min: 97.5 °F (36.4 °C)  Max: 98.5 °F (36.9 °C)   Temp core:      BP: 133/80 (23) BP  Min: 85/53  Max: 143/81   MAP (non-invasive) Noninvasive MAP (mmHg): 97 (23) Noninvasive MAP (mmHg)  Av.4  Min: 68  Max: 121   Pulse: 79 (23) Pulse  Min: 65  Max: 86   Resp: 18 (23) Resp  Min: 16  Max: 20   SpO2: 96 % (23) SpO2  Min: 91 %  Max: 99 %   Device: nasal cannula (23)    Flow Rate: 1 (06/12/23 1800) Flow (L/min)  Min: 1  Max: 1         23  1300 23  0600   Weight: 76.8 kg (169 lb 5 oz)  77.9 kg (171 lb 11.8 oz)        Intake/Ouptut 24 hrs (7:00AM - 6:59 AM)  Intake & Output (last 3 days)         06/09 0701  06/10 0700 06/10 0701  06/11 0700 06/11 0701 06/12 0700 06/12 0701 06/13 0700    P.O.   360 465    I.V. (mL/kg)   238 (3.1) 33.6 (0.4)    Total Intake(mL/kg)   598 (7.7) 498.6 (6.4)    Urine (mL/kg/hr)   500     Stool   0     Total Output   500     Net   +98 +498.6            Urine Unmeasured Occurrence   1 x     Stool Unmeasured Occurrence   1 x 1 x          Physical Examination  Telemetry:  Rhythm: normal sinus rhythm (06/12/23 1800)         Constitutional:  No acute distress.   Cardiovascular: RRR.    Respiratory: Normal breath sounds  No adventitious sounds   Abdominal:  Soft with no tenderness.   Extremities: No Edema   Neurological:   Alert, Oriented, Cooperative.  Best Eye Response: 4-->(E4) spontaneous (06/12/23 1800)  Best Motor Response: 6-->(M6) obeys commands (06/12/23 1800)  Best Verbal Response: 5-->(V5) oriented (06/12/23 1800)  [place] Coma Scale Score: 15 (06/12/23 1800)     Results Reviewed:  Laboratory  Microbiology  Radiology  Pathology    Hematology:  Results from last 7 days   Lab Units 06/12/23  0421 06/11/23  1322   WBC 10*3/mm3 8.11 9.35   NEUTROS ABS 10*3/mm3 5.00 5.80   IMM GRAN % % 0.4 0.2   LYMPHS ABS 10*3/mm3 2.18 2.59   MONOS ABS 10*3/mm3 0.73 0.77   EOS ABS 10*3/mm3 0.14 0.13   BASOS ABS 10*3/mm3 0.03 0.04     Results from last 7 days   Lab Units 06/12/23  0421 06/11/23  1322   HEMOGLOBIN g/dL 10.7* 11.2*   MCV fL 88.7 91.9   RDW % 12.5 12.9   PLATELETS 10*3/mm3 179 186     Chemistry:  Estimated Creatinine Clearance: 83.7 mL/min (A) (by C-G formula based on SCr of 0.53 mg/dL (L)).    Results from last 7 days   Lab Units 06/12/23  0421 06/11/23  1322   SODIUM mmol/L 142 145   POTASSIUM mmol/L 3.7 3.9   CHLORIDE mmol/L 111* 112*   CO2 mmol/L 25.0 26.0   BUN mg/dL 20 21   CREATININE mg/dL 0.53* 0.61   GLUCOSE mg/dL 99 101*     Results from last 7 days   Lab Units  06/12/23  0421 06/11/23  1322   CALCIUM mg/dL 8.1* 8.3*   MAGNESIUM mg/dL 2.3 1.9   PHOSPHORUS mg/dL 3.2 3.3     Hepatic Panel:  Results from last 7 days   Lab Units 06/12/23  0421 06/11/23  1322   ALBUMIN g/dL 3.1* 3.4*   BILIRUBIN mg/dL 0.4 0.3   AST (SGOT) U/L 16 20   ALT (SGPT) U/L 25 28   ALK PHOS U/L 62 64      Coagulation Labs:  Results from last 7 days   Lab Units 06/11/23  1309 06/10/23  2230 06/10/23  0923   PROTIME Seconds 16.2*  --  12.3   INR  1.29*  --  1.15   APTT seconds 47.6* 58.2* 21.3*      Cardiac Labs:  Results from last 7 days   Lab Units 06/11/23  1539 06/11/23  1322   HSTROP T ng/L 17* 19*     Biomarkers:  Results from last 7 days   Lab Units 06/12/23  0421 06/11/23  1322   LACTATE mmol/L  --  1.2   D DIMER QUANT MCGFEU/mL 0.81*  --        Arterial Blood Gases:  Results from last 7 days   Lab Units 06/11/23  1336   PH, ARTERIAL pH units 7.385   PCO2, ARTERIAL mm Hg 41.4   PO2 ART mm Hg 84.1   FIO2 % 32       Images:  XR Chest 1 View    Result Date: 6/11/2023  1.The heart appears borderline enlarged. 2.Mild left basilar opacities which could represent mild atelectasis, infiltrate, or developing infarct. Electronically Signed: Shay Serra  6/11/2023 1:34 PM EDT  Workstation ID: [id number]    Duplex Venous Lower Extremity - Bilateral CAR    Addendum Date: 6/11/2023      Sub-acute appearing right lower extremity deep vein thrombosis noted in the peroneal.   Acute appearing left lower extremity deep vein thrombosis noted in the peroneal.   All other veins appeared negative for a DVT and SVT bilaterally.      Echo:  Results for orders placed during the hospital encounter of 06/11/23    Adult Transthoracic Echo Complete W/ Cont if Necessary Per Protocol    Interpretation Summary    Left ventricular systolic function is normal. Calculated left ventricular EF = 50.1% Left ventricular ejection fraction appears to be 51 - 55%.    The right ventricular cavity is dilated.    The right atrial cavity is  dilated.    Moderate tricuspid valve regurgitation is present.    Estimated right ventricular systolic pressure from tricuspid regurgitation is markedly elevated (>55 mmHg). Calculated right ventricular systolic pressure from tricuspid regurgitation is 58 mmHg.    RV free wall strain = -18.6%.      Results: Reviewed.  I reviewed the patient's new laboratory and imaging results.  I independently reviewed the patient's new images.    Medications: Reviewed.    Assessment   A/P     Hospital:  LOS: 1 day   ICU: 1d 5h     Active Hospital Problems    Diagnosis    **Acute massive pulmonary embolism [I26.99]    Essential hypertension [I10]    Hypothyroidism (acquired) [E03.9]    Asthma [J45.909]     Cristal is a 77 y.o. female admitted on 2023 with Pulmonary embolism, bilateral [I26.99]  Acute massive pulmonary embolism [I26.99]    Assessment/Management/Treatment Plan:        Venous Thromboembolic disease: P embolism and Bilateral DVT - R/O CTEPH  Pulmonary HTN - RVSP 58 mmHg  Group IV  Factor V level, Homocysteine and Factor II DNA analysis: normal  Pulmonary   Asthma, no exacerbation  Cardiovascular  HTN  Endocrine  Body mass index is 31.92 kg/m². Obese Class I: 30-34.9kg/m2  Hypothyroidism    Lab Results   Component Value Date    TSH 2.250 2023      At risk for DM (pre-diabetes) based on her HbA1c. [HbA1C 5.7%-6.4%, eA-139 mg/dL].     Lab Results   Lab Value Date/Time    HGBA1C 6.00 (H) 2023 1322       Results from last 7 days   Lab Units 23  1703 23  1141 23  0742 23  2144 23  1608   GLUCOSE mg/dL 110 101 126 129 95       Diet: Diet: Cardiac Diets; Healthy Heart (2-3 Na+); Texture: Regular Texture (IDDSI 7); Fluid Consistency: Thin (IDDSI 0)  Orders Placed This Encounter      DIET MESSAGE For breakfast patient wants a fried egg, blanca, toast with jelly, coffee and milk      Advance Directives: Code Status and Medical Interventions:   Ordered at: 23 1309     Level  Of Support Discussed With:    Patient     Code Status (Patient has no pulse and is not breathing):    CPR (Attempt to Resuscitate)     Medical Interventions (Patient has pulse or is breathing):    Full Support     Release to patient:    Routine Release        DVT prophylaxis:  Medical DVT prophylaxis orders are present.       In brief:  Discussed with Dr. Martinez. No plans for procedures.  OK to transition UFH continuous intravenous infusion to oral anticoagulation.  She lives in [place], where she receives her medical care. She also lives several months in [place] near [place].  Need to consider referral to a [facility]  Disposition: Keep in ICU.    Plan of care and goals reviewed during interdisciplinary rounds.  I discussed the patient's findings and my recommendations with patient, family, and nursing staff    MDM:  Problem(s) High due to: Acute or Chronic illness or injury that may poses a threat to life or bodily function  Data: High due to: Review of prior external records from each unique source, Review of the result(s) of each unique test, Ordering of each unique test, and Discuss management or test interpretation with external physician or NPP (not separately reported)    High    [x] Primary Attending Intensive Care Medicine - Nutrition Support   [] Consultant    Copied text in this note has been reviewed and is accurate as of 06/12/23    Pending Labs       Order Current Status    AMI In process    Anticardiolipin Antibody, IgG / M, Qn In process    Antithrombin III In process    Factor 5 Activity In process    Lupus Anticoagulant In process    Protein C Antigen, Total In process    Protein C-Functional In process    Protein S Antigen, Total In process    Protein S Functional In process

## 2023-06-12 NOTE — PROGRESS NOTES
"Cristal Reddy  [id number]  1945   LOS: 1 day   Patient Care Team:  Provider, No Known as PCP - General    Chief Complaint:  ACUTE MASSIVE PTE / HTN / SYNCOPE / BLE DVT    Subjective     Comfortable, semi-recumbent in bed on 2 L/min nasal cannula (oximetry 96%).  She states \"I am doing a whole lot better.\"  She denies any cardiopulmonary complaints currently.  No leg discomfort, pleurisy, cough, hemoptysis, or recurrent presyncopal episodes.    Objective     Vital Sign Min/Max for last 24 hours  Temp  Min: 97.6 °F (36.4 °C)  Max: 98.5 °F (36.9 °C)   BP  Min: 85/53  Max: 144/97   Pulse  Min: 65  Max: 87   Resp  Min: 18  Max: 20   SpO2  Min: 92 %  Max: 98 %      Weight  Min: 76.8 kg (169 lb 5 oz)  Max: 77.9 kg (171 lb 11.8 oz)         06/11/23  1300 06/12/23  0600   Weight: 76.8 kg (169 lb 5 oz) 77.9 kg (171 lb 11.8 oz)         Intake/Output Summary (Last 24 hours) at 6/12/2023 0746  Last data filed at 6/12/2023 0637  Gross per 24 hour   Intake 598 ml   Output 500 ml   Net 98 ml       Physical Exam:     General Appearance:    Alert, cooperative, in no acute distress   Lungs:     Clear to auscultation,respirations regular, even and                unlabored    Heart:    Regular and normal rate, normal S1 and S2, no            murmur, no gallop, no rub, no click   Abdomen:  Extremities:   Soft, nontender, bowel sounds audible x4    No edema, normal range of motion   Pulses:   Pulses palpable and equal bilaterally      Results Review:   Results from last 7 days   Lab Units 06/12/23  0421 06/11/23  1322   SODIUM mmol/L 142 145   POTASSIUM mmol/L 3.7 3.9   CHLORIDE mmol/L 111* 112*   CO2 mmol/L 25.0 26.0   BUN mg/dL 20 21   CREATININE mg/dL 0.53* 0.61   GLUCOSE mg/dL 99 101*   CALCIUM mg/dL 8.1* 8.3*     Results from last 7 days   Lab Units 06/12/23  0421 06/11/23  1322   WBC 10*3/mm3 8.11 9.35   HEMOGLOBIN g/dL 10.7* 11.2*   HEMATOCRIT % 33.1* 36.1   PLATELETS 10*3/mm3 179 186     Results from last 7 days   Lab Units " 06/11/23  1322   HEMOGLOBIN A1C % 6.00*     Results from last 7 days   Lab Units 06/11/23  1539 06/11/23  1322   HSTROP T ng/L 17* 19*     ALBUMIN: 3.1  LFTs: WNL  TSH: WNL  D-DIMER: 0.81  ECHO:      Left ventricular systolic function is normal. Calculated left ventricular EF = 50.1% Left ventricular ejection fraction appears to be 51 - 55%.    The right ventricular cavity is dilated.    The right atrial cavity is dilated.    Moderate tricuspid valve regurgitation is present.    Estimated right ventricular systolic pressure from tricuspid regurgitation is markedly elevated (>55 mmHg). Calculated right ventricular systolic pressure from tricuspid regurgitation is 58 mmHg.    RV free wall strain = -18.6%.    BLE VENOUS DUPLEX:      Sub-acute appearing right lower extremity deep vein thrombosis noted in the peroneal.    Acute appearing left lower extremity deep vein thrombosis noted in the peroneal.    All other veins appeared negative for a DVT and SVT bilaterally.    Medication Review: REVIEWED; DRIP = continuous IV heparin 12 units/kilogram/hour infusion.    Assessment & Plan     Stable clinical course.  Acceptable hemodynamics and oxygenation and largely asymptomatic at bedrest.  Would defer mechanical thromboembolectomy (EKOS) at this time and continue anticoagulation.  Would consider altering IV heparin to NOAC and allow bedside commode.    Discussed with patient and daughter in room.      Acute massive pulmonary embolism    Essential hypertension    Hypothyroidism (acquired)    Asthma      06/12/23  07:46 EDT    Health Care Proxy (HCP)/Completed today

## 2023-06-12 NOTE — PLAN OF CARE
Goal Outcome Evaluation:  Plan of Care Reviewed With: patient        Progress: improving  Outcome Evaluation: VSS on 1L NC, SOA with activity improving per patient. able to ambulate in room with 1 assist. Heparin gtt DC'd, eliquis initiated. Daughter remains at bedside.

## 2023-06-12 NOTE — CASE MANAGEMENT/SOCIAL WORK
Discharge Planning Assessment  [facility]     Patient Name: Cristal Reddy  MRN: [id number]  Today's Date: 6/12/2023    Admit Date: 6/11/2023    Plan: Home   Discharge Needs Assessment       Row Name 06/12/23 1218       Living Environment    People in Home alone    Current Living Arrangements home    Primary Care Provided by self    Provides Primary Care For no one    Family Caregiver if Needed child(darcie), adult    Able to Return to Prior Arrangements yes       Transition Planning    Patient/Family Anticipates Transition to home    Transportation Anticipated family or friend will provide       Discharge Needs Assessment    Readmission Within the Last 30 Days no previous admission in last 30 days    Equipment Currently Used at Home none    Current Discharge Risk lives alone                   Discharge Plan       Row Name 06/12/23 1219       Plan    Plan Home    Patient/Family in Agreement with Plan yes    Plan Comments I met with Ms. Reddy at the bedside. She lives alone in [place], [place]. She is independent with mobility and activities of daily living. She drives herself when leaving the home and is not current with any home or outpatient services. She has no medical equipment and denies any difficulty affording her medications. She anticipates going home at the time of discharge and is trying to figure out if she will fly or drive back home to [place]. She is agreeable to use [facility] for her Eliquis. Her insurance does not require a prior authorization for Eliquis. No discharge needs identified at this time. CM will continue to follow.    Final Discharge Disposition Code 01 - home or self-care                  Continued Care and Services - Admitted Since 6/11/2023    Coordination has not been started for this encounter.       Expected Discharge Date and Time       Expected Discharge Date Expected Discharge Time    Jun 16, 2023            Demographic Summary       Row Name 06/12/23  1216       General Information    General Information Comments Confirmed PCP to be Miryam Grey and Elma Juan Medicare to be insurer.                   Functional Status       Row Name 06/12/23 1218       Functional Status, IADL    Medications independent    Meal Preparation independent    Housekeeping independent    Laundry independent    Shopping independent       Employment/    Employment Status retired                   Psychosocial    No documentation.                  Abuse/Neglect    No documentation.                  Legal    No documentation.                  Substance Abuse    No documentation.                  Patient Forms    No documentation.                     Gerardo Lewis RN

## 2023-06-12 NOTE — PLAN OF CARE
Problem: Adult Inpatient Plan of Care  Goal: Plan of Care Review  Outcome: Ongoing, Progressing  Flowsheets (Taken 6/12/2023 0740)  Plan of Care Reviewed With:   patient   daughter  Outcome Evaluation: VSS on 1LNC. Patient continues to c/o chest tightness and right sided rib pain. Shortness of breath reported with any activity. Heparin gtt infusing per pharmacy. Daughter @ bedside. Home meds restarted per APRN.

## 2023-06-13 LAB
ANA SER QL: NEGATIVE
ANION GAP SERPL CALCULATED.3IONS-SCNC: 6 MMOL/L (ref 5–15)
BASOPHILS # BLD AUTO: 0.02 10*3/MM3 (ref 0–0.2)
BASOPHILS NFR BLD AUTO: 0.3 % (ref 0–1.5)
BUN SERPL-MCNC: 20 MG/DL (ref 8–23)
BUN/CREAT SERPL: 32.8 (ref 7–25)
CALCIUM SPEC-SCNC: 8.3 MG/DL (ref 8.6–10.5)
CARDIOLIPIN IGG SER IA-ACNC: <9 GPL U/ML (ref 0–14)
CARDIOLIPIN IGM SER IA-ACNC: <9 MPL U/ML (ref 0–12)
CHLORIDE SERPL-SCNC: 108 MMOL/L (ref 98–107)
CO2 SERPL-SCNC: 25 MMOL/L (ref 22–29)
CREAT SERPL-MCNC: 0.61 MG/DL (ref 0.57–1)
DEPRECATED RDW RBC AUTO: 41.4 FL (ref 37–54)
EGFRCR SERPLBLD CKD-EPI 2021: 91.6 ML/MIN/1.73
EOSINOPHIL # BLD AUTO: 0.17 10*3/MM3 (ref 0–0.4)
EOSINOPHIL NFR BLD AUTO: 2.4 % (ref 0.3–6.2)
ERYTHROCYTE [DISTWIDTH] IN BLOOD BY AUTOMATED COUNT: 12.6 % (ref 12.3–15.4)
FACT V ACT/NOR PPP: 121 % (ref 70–150)
GLUCOSE BLDC GLUCOMTR-MCNC: 116 MG/DL (ref 70–130)
GLUCOSE BLDC GLUCOMTR-MCNC: 120 MG/DL (ref 70–130)
GLUCOSE BLDC GLUCOMTR-MCNC: 147 MG/DL (ref 70–130)
GLUCOSE BLDC GLUCOMTR-MCNC: 172 MG/DL (ref 70–130)
GLUCOSE SERPL-MCNC: 114 MG/DL (ref 65–99)
HCT VFR BLD AUTO: 34.9 % (ref 34–46.6)
HGB BLD-MCNC: 10.7 G/DL (ref 12–15.9)
IMM GRANULOCYTES # BLD AUTO: 0.02 10*3/MM3 (ref 0–0.05)
IMM GRANULOCYTES NFR BLD AUTO: 0.3 % (ref 0–0.5)
LYMPHOCYTES # BLD AUTO: 1.86 10*3/MM3 (ref 0.7–3.1)
LYMPHOCYTES NFR BLD AUTO: 26.1 % (ref 19.6–45.3)
MCH RBC QN AUTO: 27.8 PG (ref 26.6–33)
MCHC RBC AUTO-ENTMCNC: 30.7 G/DL (ref 31.5–35.7)
MCV RBC AUTO: 90.6 FL (ref 79–97)
MONOCYTES # BLD AUTO: 0.79 10*3/MM3 (ref 0.1–0.9)
MONOCYTES NFR BLD AUTO: 11.1 % (ref 5–12)
NEUTROPHILS NFR BLD AUTO: 4.28 10*3/MM3 (ref 1.7–7)
NEUTROPHILS NFR BLD AUTO: 59.8 % (ref 42.7–76)
NRBC BLD AUTO-RTO: 0 /100 WBC (ref 0–0.2)
PLATELET # BLD AUTO: 185 10*3/MM3 (ref 140–450)
PMV BLD AUTO: 10.6 FL (ref 6–12)
POTASSIUM SERPL-SCNC: 3.4 MMOL/L (ref 3.5–5.2)
POTASSIUM SERPL-SCNC: 4.8 MMOL/L (ref 3.5–5.2)
RBC # BLD AUTO: 3.85 10*6/MM3 (ref 3.77–5.28)
SODIUM SERPL-SCNC: 139 MMOL/L (ref 136–145)
WBC NRBC COR # BLD: 7.14 10*3/MM3 (ref 3.4–10.8)

## 2023-06-13 PROCEDURE — 84132 ASSAY OF SERUM POTASSIUM: CPT | Performed by: INTERNAL MEDICINE

## 2023-06-13 PROCEDURE — 80048 BASIC METABOLIC PNL TOTAL CA: CPT | Performed by: INTERNAL MEDICINE

## 2023-06-13 PROCEDURE — 82948 REAGENT STRIP/BLOOD GLUCOSE: CPT

## 2023-06-13 PROCEDURE — 85025 COMPLETE CBC W/AUTO DIFF WBC: CPT | Performed by: INTERNAL MEDICINE

## 2023-06-13 RX ORDER — METFORMIN HYDROCHLORIDE 500 MG/1
500 TABLET, EXTENDED RELEASE ORAL
Status: DISCONTINUED | OUTPATIENT
Start: 2023-06-14 | End: 2023-06-15 | Stop reason: HOSPADM

## 2023-06-13 RX ORDER — POTASSIUM CHLORIDE 20 MEQ/1
40 TABLET, EXTENDED RELEASE ORAL EVERY 4 HOURS
Status: COMPLETED | OUTPATIENT
Start: 2023-06-13 | End: 2023-06-13

## 2023-06-13 RX ADMIN — ATORVASTATIN CALCIUM 40 MG: 40 TABLET, FILM COATED ORAL at 21:13

## 2023-06-13 RX ADMIN — PAROXETINE HYDROCHLORIDE 10 MG: 10 TABLET, FILM COATED ORAL at 09:02

## 2023-06-13 RX ADMIN — POTASSIUM CHLORIDE 40 MEQ: 1500 TABLET, EXTENDED RELEASE ORAL at 06:41

## 2023-06-13 RX ADMIN — APIXABAN 10 MG: 5 TABLET, FILM COATED ORAL at 09:02

## 2023-06-13 RX ADMIN — POTASSIUM CHLORIDE 40 MEQ: 1500 TABLET, EXTENDED RELEASE ORAL at 12:02

## 2023-06-13 RX ADMIN — LEVOTHYROXINE SODIUM 100 MCG: 0.1 TABLET ORAL at 06:07

## 2023-06-13 RX ADMIN — APIXABAN 10 MG: 5 TABLET, FILM COATED ORAL at 21:13

## 2023-06-13 RX ADMIN — Medication 10 ML: at 21:13

## 2023-06-13 RX ADMIN — TRAZODONE HYDROCHLORIDE 25 MG: 50 TABLET ORAL at 21:13

## 2023-06-13 RX ADMIN — Medication 10 ML: at 12:03

## 2023-06-13 NOTE — DISCHARGE SUMMARY
DISCHARGE SUMMARY    Patient Name: Cristal Reddy  : 1945  MRN: [id number]    Date of Admission: 2023 12:56 PM  Date of Discharge: 2023  7:45 PM    Primary Care Physician: Provider, No Known    Reason for hospitalization     HPI:  Cristal Reddy is a 77 y.o. female was admitted on 2023 with the initial diagnosis of Pulmonary embolism, bilateral [I26.99]  Acute massive pulmonary embolism [I26.99].    Patient has a PMH of hypertension, hypothyroidism, and possible underlying asthma who had been in her usual state of health and is actually visiting [place] from [place] who was witnessed to have a syncopal episode the day PTA by family.  It is unclear if she actually lost pulse or not however she did receive approximately 1 round of chest compressions from her family.  She regained consciousness and was brought to [facility] in [place], [place],.  She was found to have large bilateral pulmonary emboli without saddle formation and concern for right heart strain.  Our facility was consulted for transfer for possible endovascular thrombolytic versus thromboembolectomy.  The patient has been treated with heparin.      She arrived to our facility with normal blood pressure and pulse currently.  She was requiring only several liters of nasal cannula oxygen.  She currently denied any chest pain and feels only mildly short of breath.  She denied any abdominal discomfort.  She has denied any recent lower extremity edema or swelling.  She denied visual change, headache, or focal weakness.  She stated that she has never had a history of blood clots in the past nor does she know of any in the family.  She had been on a long road trip recently from [place].  There have been no medication changes recently.  She denied any trauma.  (End of copied text).     Significant findings.  Discharge diagnosis/problems:     Active Hospital Problems    Diagnosis    **Acute massive pulmonary embolism [I26.99]      Priority: High    Pulmonary hypertension [I27.20]     R/O Group 4, CTEPH    Essential hypertension [I10]    Hypothyroidism (acquired) [E03.9]    Asthma [J45.909]        Physical Examination and Data     Vitals:  Temp: 98.3 °F (36.8 °C) (06/14/23 0400) No data recorded   Temp core:      BP: 155/94 (06/14/23 1200) BP  Min: 147/85  Max: 155/94   Pulse: 85 (06/14/23 1900) Pulse  Min: 72  Max: 86   Resp: 18 (06/14/23 1900) Resp  Min: 18  Max: 18   SpO2: 96 % (06/14/23 1900) SpO2  Min: 92 %  Max: 97 %   Device: nasal cannula (06/14/23 1900)    Flow Rate: 2 (06/14/23 1900) Flow (L/min)  Min: 1.5  Max: 2       Physical Examination  Telemetry:  Rhythm: normal sinus rhythm (06/14/23 1000)     Constitutional:  No acute distress.   Cardiovascular: RRR.    Respiratory: Normal breath sounds  No adventitious sounds   Abdominal:  Soft with no tenderness.   Extremities: No Edema   Neurological:             Alert, Oriented, Cooperative.  Best Eye Response: 4-->(E4) spontaneous (06/14/23 1000)  Best Motor Response: 6-->(M6) obeys commands (06/14/23 1000)  Best Verbal Response: 5-->(V5) oriented (06/14/23 1000)  [place] Coma Scale Score: 15 (06/14/23 1000)         Results Reviewed:  Laboratory  Microbiology  Radiology  Pathology    Hematology:  Results from last 7 days   Lab Units 06/14/23  0357 06/13/23  0433 06/12/23  0421   WBC 10*3/mm3 7.07 7.14 8.11   NEUTROS ABS 10*3/mm3 4.18 4.28 5.00   IMM GRAN % % 0.1 0.3 0.4   LYMPHS ABS 10*3/mm3 1.94 1.86 2.18   MONOS ABS 10*3/mm3 0.72 0.79 0.73   EOS ABS 10*3/mm3 0.20 0.17 0.14   BASOS ABS 10*3/mm3 0.02 0.02 0.03   HEMOGLOBIN g/dL 11.5* 10.7* 10.7*   MCV fL 88.1 90.6 88.7   RDW % 12.6 12.6 12.5   PLATELETS 10*3/mm3 211 185 179       Chemistry:  Estimated Creatinine Clearance: 69 mL/min (by C-G formula based on SCr of 0.64 mg/dL).    Results from last 7 days   Lab Units 06/14/23  0357 06/13/23  1701 06/13/23  0433   SODIUM mmol/L 140  --  139   POTASSIUM mmol/L 4.4 4.8 3.4*   CHLORIDE mmol/L  109*  --  108*   CO2 mmol/L 23.0  --  25.0   BUN mg/dL 12  --  20   CREATININE mg/dL 0.64  --  0.61   GLUCOSE mg/dL 117*  --  114*     Results from last 7 days   Lab Units 06/14/23  0357 06/13/23  0433 06/12/23  0421 06/11/23  1322   CALCIUM mg/dL 9.0 8.3* 8.1* 8.3*   MAGNESIUM mg/dL  --   --  2.3 1.9   PHOSPHORUS mg/dL  --   --  3.2 3.3       Hepatic Panel:  Results from last 7 days   Lab Units 06/12/23  0421 06/11/23  1322   ALBUMIN g/dL 3.1* 3.4*   BILIRUBIN mg/dL 0.4 0.3   AST (SGOT) U/L 16 20   ALT (SGPT) U/L 25 28   ALK PHOS U/L 62 64        Coagulation Labs:  Results from last 7 days   Lab Units 06/11/23  1309 06/10/23  2230 06/10/23  0923   PROTIME Seconds 16.2*  --  12.3   INR  1.29*  --  1.15   APTT seconds 47.6* 58.2* 21.3*        Cardiac Labs:  Results from last 7 days   Lab Units 06/11/23  1539 06/11/23  1322   HSTROP T ng/L 17* 19*       Biomarkers:  Results from last 7 days   Lab Units 06/12/23  0421 06/11/23  1322   LACTATE mmol/L  --  1.2   D DIMER QUANT MCGFEU/mL 0.81*  --      Arterial Blood Gases:  Results from last 7 days   Lab Units 06/11/23  1336   PH, ARTERIAL pH units 7.385   PCO2, ARTERIAL mm Hg 41.4   PO2 ART mm Hg 84.1   FIO2 % 32       Images:  CT Head Without Contrast    Result Date: 6/10/2023  1. No acute intracranial abnormality or obvious mass. 2. Atrophy and chronic ischemic white matter changes as above. This study was performed using dose reduction techniques to achieve radiation exposure as low as reasonably achievable (ALARA)     XR Chest 1 View    Result Date: 6/11/2023  1.The heart appears borderline enlarged. 2.Mild left basilar opacities which could represent mild atelectasis, infiltrate, or developing infarct. Electronically Signed: Shay Serra  6/11/2023 1:34 PM EDT  Workstation ID: [id number]    XR Chest 1 View    Result Date: 6/10/2023  No acute cardiopulmonary process . Images reviewed, interpreted, and dictated by Dr. Rodrigo Juarez. Transcribed by Annette Fernández,  RODO.    Duplex Venous Lower Extremity - Bilateral CAR    Addendum Date: 6/11/2023      Sub-acute appearing right lower extremity deep vein thrombosis noted in the peroneal.   Acute appearing left lower extremity deep vein thrombosis noted in the peroneal.   All other veins appeared negative for a DVT and SVT bilaterally.     CT chest for pulmonary embolus    Result Date: 6/10/2023  1. Moderate to large bilateral pulmonary embolism with evidence of right heart strain.    This study was performed using dose reduction techniques to achieve radiation exposure as low as reasonably achievable (ALARA)        Echo:  Results for orders placed during the hospital encounter of 06/11/23    Adult Transthoracic Echo Complete W/ Cont if Necessary Per Protocol    Interpretation Summary    Left ventricular systolic function is normal. Calculated left ventricular EF = 50.1% Left ventricular ejection fraction appears to be 51 - 55%.    The right ventricular cavity is dilated.    The right atrial cavity is dilated.    Moderate tricuspid valve regurgitation is present.    Estimated right ventricular systolic pressure from tricuspid regurgitation is markedly elevated (>55 mmHg). Calculated right ventricular systolic pressure from tricuspid regurgitation is 58 mmHg.    RV free wall strain = -18.6%.      Results: Reviewed.  I reviewed the patient's new laboratory and imaging results.  I independently reviewed the patient's new images.    Medications: Reviewed.    Hospital Course, Consults and Procedures  provided:   Patient was admitted to the ICU 2* the above mentioned problems in the HPI. Heparin drip changed to Eliquis on 6/12/23. ECHO during admission revealed LVEF 51-55% with moderate TR (RVSP 58). Oxygen was weaned off for a short period but unfortunately began to require it again during ambulation in which her SPO2 decreased to 88%.    Cardiology followed during stay recommending follow-up in 2-3 weeks upon return to [facility] with  consideration of ECHO and pulmonary ventilation - perfusion scan in 4-6 months with long-term DVT prophylaxis with Xarelto 10 mg daily after completing Eliquis.     PCP has been contacted and referral to [facility] has been initiated. She will require home O2 which has been approved.     Patient has remained hemodynamically stable during the duration of her stay and is ready for discharge home. She is ambulating and tolerating an oral diet. Follow-ups have been initiated and scheduled. She has been provided a wheelchair for discharge. Discharge orders and follow-ups have been coordinated by Dr. Kumari.     Consults:  Consults       Date and Time Order Name Status Description    6/11/2023  1:12 PM Inpatient Cardiology Consult Completed     6/11/2023  1:08 PM Inpatient Cardiology Consult Completed                Procedures:  None          Condition on discharge     Good. (Vital signs within normal limits. Patient conscious and comfortable)    Patient and family instructions     Discharge Disposition: Home or Self Care    CODE STATUS:   Code Status and Medical Interventions:   Ordered at: 06/11/23 1309     Level Of Support Discussed With:    Patient     Code Status (Patient has no pulse and is not breathing):    CPR (Attempt to Resuscitate)     Medical Interventions (Patient has pulse or is breathing):    Full Support     Release to patient:    Routine Release       No Known Allergies    Discharge Medications:     Discharge Medications        New Medications        Instructions Start Date   Eliquis 5 MG tablet tablet  Generic drug: apixaban   Take 2 tablets by mouth Every 12 (Twelve) Hours for 9 doses and then 1 tablet by mouth every 12 hours thereafter      apixaban 5 MG tablet tablet  Commonly known as: ELIQUIS   5 mg, Oral, Every 12 Hours Scheduled   Start Date: June 19, 2023            Continue These Medications        Instructions Start Date   atorvastatin 40 MG tablet  Commonly  known as: LIPITOR   40 mg, Oral, Nightly      docusate sodium 250 MG capsule  Commonly known as: COLACE   250 mg, Oral, Daily      levothyroxine 100 MCG tablet  Commonly known as: SYNTHROID, LEVOTHROID   100 mcg, Oral, Every Early Morning      metFORMIN  MG 24 hr tablet  Commonly known as: GLUCOPHAGE-XR   500 mg, Oral, Daily With Breakfast      PARoxetine 10 MG tablet  Commonly known as: PAXIL   10 mg, Oral, Every Morning      traZODone 50 MG tablet  Commonly known as: DESYREL   50 mg, Oral, Nightly      Vitamin D (Cholecalciferol) 10 MCG (400 UNIT) tablet  Commonly known as: CHOLECALCIFEROL   400 Units, Oral, Daily             Stop These Medications      naproxen sodium 220 MG tablet  Commonly known as: ALEVE              Activity:      Activity Instructions       Activity as Tolerated              PT/OT/ST orders: None     Diet: No diet orders on file                     Time Spent on Discharge:    I spent  < 30  minutes on this discharge activity which included: face-to-face encounter with the patient, reviewing the data in the system, coordination of the care with the nursing staff as well as consultants, documentation, and entering orders.

## 2023-06-13 NOTE — PROGRESS NOTES
"Cristal Reddy  [id number]  1945   LOS: 2 days   Patient Care Team:  Provider, No Known as PCP - General    Chief Complaint:  ACUTE MASSIVE PTE / HTN / SYNCOPE / BLE DVT / GLUCOSE INTOLERANCE WITH BORDERLINE HGA1c    Subjective     Comfortable semirecumbent in bed this morning off supplemental oxygen therapy (room oximetry 93%).  Mild-moderate tachypnea and dyspnea with activity around room.  Mild right axillary chest \"soreness\" but she denies pleurisy, anginal type chest discomfort, cough, sputum production, or hemoptysis.  No GI or  difficulty.  She states she is tentatively scheduled for discharge in a.m.    Objective     Vital Sign Min/Max for last 24 hours  Temp  Min: 97.4 °F (36.3 °C)  Max: 98.6 °F (37 °C)   BP  Min: 93/49  Max: 143/81   Pulse  Min: 62  Max: 86   Resp  Min: 16  Max: 20   SpO2  Min: 91 %  Max: 99 %      Weight  Min: 77.3 kg (170 lb 6.7 oz)  Max: 77.3 kg (170 lb 6.7 oz)         06/12/23  0600 06/13/23  0600   Weight: 77.9 kg (171 lb 11.8 oz) 77.3 kg (170 lb 6.7 oz)         Intake/Output Summary (Last 24 hours) at 6/13/2023 0737  Last data filed at 6/12/2023 2000  Gross per 24 hour   Intake 738.57 ml   Output --   Net 738.57 ml       Physical Exam:     General Appearance:    Alert, cooperative, in no acute distress   Lungs:     Clear to auscultation,respirations regular, even and                unlabored    Heart:    Regular and normal rate, normal S1 and S2, no            murmur, no gallop, no rub, no click   Abdomen:  Extremities:   Soft, nontender, bowel sounds audible x4    No edema, normal range of motion   Pulses:   Pulses palpable and equal bilaterally      Results Review:   Results from last 7 days   Lab Units 06/13/23  0433 06/12/23  0421 06/11/23  1322   SODIUM mmol/L 139 142 145   POTASSIUM mmol/L 3.4* 3.7 3.9   CHLORIDE mmol/L 108* 111* 112*   CO2 mmol/L 25.0 25.0 26.0   BUN mg/dL 20 20 21   CREATININE mg/dL 0.61 0.53* 0.61   GLUCOSE mg/dL 114* 99 101*   CALCIUM mg/dL 8.3* 8.1* " 8.3*     Results from last 7 days   Lab Units 06/13/23  0433 06/12/23  0421 06/11/23  1322   WBC 10*3/mm3 7.14 8.11 9.35   HEMOGLOBIN g/dL 10.7* 10.7* 11.2*   HEMATOCRIT % 34.9 33.1* 36.1   PLATELETS 10*3/mm3 185 179 186     Results from last 7 days   Lab Units 06/11/23  1322   HEMOGLOBIN A1C % 6.00*     Results from last 7 days   Lab Units 06/11/23  1539 06/11/23  1322   HSTROP T ng/L 17* 19*     NO NEW CXR / EKG.    Medication Review: REVIEWED; NO DRIPS.    Assessment & Plan     Acceptable hemodynamics and oxygenation.  Would continue current treatment.  Hopefully home in the next 24-36 hours if she continues to make excellent progress with follow-up in the next 2-3 weeks upon return to [place] and consideration of echocardiogram and pulmonary ventilation-perfusion scan in 4-6 months with long-term DVT prophylaxis with Xarelto 10 mg daily after completing therapeutic apixaban.  She will need to resume metformin  milligrams daily at discharge.    We will standby and be available to see as needed during current hospitalization.  Thanks.      Acute massive pulmonary embolism    Essential hypertension    Hypothyroidism (acquired)    Asthma    06/13/23  07:37 EDT

## 2023-06-13 NOTE — PLAN OF CARE
Problem: Adult Inpatient Plan of Care  Goal: Plan of Care Review  Outcome: Ongoing, Progressing  Flowsheets  Taken 6/13/2023 0623 by Lizzy Vidal, RN  Plan of Care Reviewed With: patient  Taken 6/12/2023 1829 by Serena Robledo, RN  Outcome Evaluation: VSS. Room air this am. Patient reports feeling much better with activity, mild SOB. Patient denies any further needs this am. Potassium replacing per protocol.

## 2023-06-13 NOTE — PLAN OF CARE
Goal Outcome Evaluation:  Plan of Care Reviewed With: patient, daughter        Progress: no change  Outcome Evaluation: Pt was on RA until ambulation in hallway today when pt became SOA and desat to 88%, 2L NC applied and pt states she is more comfortable with O2 on. Case mgmt notified of need for o2 at discharge, tentative discharge home tomorrow 6/14. Potassium replaced - recheck WNL. Pt verbalized feelings of anxiety r/t travelling home to [place] after discharge, Dr. Kumari at bedside to address concerns with pt and pt's daughter.

## 2023-06-13 NOTE — PROGRESS NOTES
INTENSIVIST   PROGRESS NOTE        SUBJECTIVE     Cristal 77 y.o. female is followed for: No chief complaint on file.       Acute massive pulmonary embolism    Essential hypertension    Hypothyroidism (acquired)    Asthma    As an Intensivist, we provide an integrated approach to the ICU patient and family, medical management of comorbid conditions, including but not limited to electrolytes, glycemic control, organ dysfunction, lead interdisciplinary rounds and coordinate the care with all other services, including those from other specialists.     Interval History:  Uneventful night.    She desat while walking on the hallway on ambient air down to 88%.    Device (Oxygen Therapy): room air (23 1800): Flow (L/min): 2 (23 1600).     Temp  Min: 97.4 °F (36.3 °C)  Max: 98.6 °F (37 °C)       History     Last Reviewed by Rangel Kumari MD on 2023 at  4:29 PM    Sections Reviewed    Medical, Family, Surgical, Tobacco, Alcohol, Drug Use, Sexual Activity,   Social Documentation      Problem list reviewed by Rangel Kumari MD on 2023 at  4:29 PM  Medicines reviewed by Rangel Kumari MD on 2023 at  4:29 PM  Allergies reviewed by Rangel Kumari MD on 2023 at  4:28 PM       The patient's relevant past medical, surgical and social history were reviewed and updated in Epic as appropriate.        OBJECTIVE     Vitals:  Temp: 97.9 °F (36.6 °C) (23 1600) Temp  Min: 97.4 °F (36.3 °C)  Max: 98.6 °F (37 °C)   Temp core:      BP: 142/76 (23 1800) BP  Min: 93/49  Max: 144/74   MAP (non-invasive) Noninvasive MAP (mmHg): 111 (23 1800) Noninvasive MAP (mmHg)  Av.6  Min: 68  Max: 121   Pulse: 90 (23 1800) Pulse  Min: 62  Max: 90   Resp: 18 (23 1800) Resp  Min: 18  Max: 22   SpO2: 91 % (23 1800) SpO2  Min: 88 %  Max: 98 %   Device: room air (23 1800)    Flow Rate: 2 (23 1600) Flow (L/min)  Min: 1  Max: 2         23  0600 23   Weight: 77.9  kg (171 lb 11.8 oz) 77.3 kg (170 lb 6.7 oz)        Intake/Ouptut 24 hrs (7:00AM - 6:59 AM)  Intake & Output (last 3 days)       06/11 0701 06/12 0700 06/12 0701 06/13 0700 06/13 0701 06/14 0700    P.O. 360 705     I.V. (mL/kg) 238 (3.1) 33.6 (0.4)     Total Intake(mL/kg) 598 (7.7) 738.6 (9.6)     Urine (mL/kg/hr) 500      Stool 0      Total Output 500      Net +98 +738.6            Urine Unmeasured Occurrence 1 x 2 x 6 x    Stool Unmeasured Occurrence 1 x 2 x 3 x        Physical Examination  Telemetry:  Rhythm: normal sinus rhythm (06/13/23 1800)         Constitutional:  No acute distress.   Cardiovascular: RRR.    Respiratory: Normal breath sounds  No adventitious sounds   Abdominal:  Soft with no tenderness.   Extremities: No Edema   Neurological:   Alert, Oriented, Cooperative.  Best Eye Response: 4-->(E4) spontaneous (06/13/23 1800)  Best Motor Response: 6-->(M6) obeys commands (06/13/23 1800)  Best Verbal Response: 5-->(V5) oriented (06/13/23 1800)  Cait Coma Scale Score: 15 (06/13/23 1800)     Results Reviewed:  Laboratory  Microbiology  Radiology  Pathology    Hematology:  Results from last 7 days   Lab Units 06/13/23 0433 06/12/23  0421 06/11/23  1322   WBC 10*3/mm3 7.14 8.11 9.35   NEUTROS ABS 10*3/mm3 4.28 5.00 5.80   IMM GRAN % % 0.3 0.4 0.2   LYMPHS ABS 10*3/mm3 1.86 2.18 2.59   MONOS ABS 10*3/mm3 0.79 0.73 0.77   EOS ABS 10*3/mm3 0.17 0.14 0.13   BASOS ABS 10*3/mm3 0.02 0.03 0.04     Results from last 7 days   Lab Units 06/13/23  0433 06/12/23  0421 06/11/23  1322   HEMOGLOBIN g/dL 10.7* 10.7* 11.2*   MCV fL 90.6 88.7 91.9   RDW % 12.6 12.5 12.9   PLATELETS 10*3/mm3 185 179 186     Chemistry:  Estimated Creatinine Clearance: 72.4 mL/min (by C-G formula based on SCr of 0.61 mg/dL).    Results from last 7 days   Lab Units 06/13/23  1701 06/13/23  0433 06/12/23  0421   SODIUM mmol/L  --  139 142   POTASSIUM mmol/L 4.8 3.4* 3.7   CHLORIDE mmol/L  --  108* 111*   CO2 mmol/L  --  25.0 25.0   BUN mg/dL   --  20 20   CREATININE mg/dL  --  0.61 0.53*   GLUCOSE mg/dL  --  114* 99     Results from last 7 days   Lab Units 06/13/23  0433 06/12/23  0421 06/11/23  1322   CALCIUM mg/dL 8.3* 8.1* 8.3*   MAGNESIUM mg/dL  --  2.3 1.9   PHOSPHORUS mg/dL  --  3.2 3.3     Hepatic Panel:  Results from last 7 days   Lab Units 06/12/23  0421 06/11/23  1322   ALBUMIN g/dL 3.1* 3.4*   BILIRUBIN mg/dL 0.4 0.3   AST (SGOT) U/L 16 20   ALT (SGPT) U/L 25 28   ALK PHOS U/L 62 64      Arterial Blood Gases:  Results from last 7 days   Lab Units 06/11/23  1336   PH, ARTERIAL pH units 7.385   PCO2, ARTERIAL mm Hg 41.4   PO2 ART mm Hg 84.1   FIO2 % 32     Images:  No radiology results for the last day  Echo:  Results for orders placed during the hospital encounter of 06/11/23    Adult Transthoracic Echo Complete W/ Cont if Necessary Per Protocol    Interpretation Summary  •  Left ventricular systolic function is normal. Calculated left ventricular EF = 50.1% Left ventricular ejection fraction appears to be 51 - 55%.  •  The right ventricular cavity is dilated.  •  The right atrial cavity is dilated.  •  Moderate tricuspid valve regurgitation is present.  •  Estimated right ventricular systolic pressure from tricuspid regurgitation is markedly elevated (>55 mmHg). Calculated right ventricular systolic pressure from tricuspid regurgitation is 58 mmHg.  •  RV free wall strain = -18.6%.      Results: Reviewed.  I reviewed the patient's new laboratory and imaging results.  I independently reviewed the patient's new images.    Medications: Reviewed.    Assessment   A/P     Hospital:  LOS: 2 days   ICU: 2d 6h     Active Hospital Problems    Diagnosis   • **Acute massive pulmonary embolism [I26.99]   • Essential hypertension [I10]   • Hypothyroidism (acquired) [E03.9]   • Asthma [J45.909]     Cristal is a 77 y.o. female admitted on 6/11/2023 with Pulmonary embolism, bilateral [I26.99]  Acute massive pulmonary embolism  [I26.99]    Assessment/Management/Treatment Plan:        1. Venous Thromboembolic disease: P embolism and Bilateral DVT - R/O CTEPH  a. Pulmonary HTN - RVSP 58 mmHg  i. Group IV  ii. Factor V level, Homocysteine and Factor II DNA analysis: juvenal  iii. AMI, Anticardiolipin IgG and IgM: negative  2. Pulmonary   a. Asthma, no exacerbation  3. Cardiovascular  a. HTN  4. Endocrine  a. Body mass index is 31.68 kg/m². Obese Class I: 30-34.9kg/m2  b. Hypothyroidism ? Treatment with Levothyroxine     Lab Results   Component Value Date    TSH 2.250 2023      c. At risk for DM (pre-diabetes) based on her HbA1c. [HbA1C 5.7%-6.4%, eA-139 mg/dL].     Lab Results   Lab Value Date/Time    HGBA1C 6.00 (H) 2023 1322       Results from last 7 days   Lab Units 23  1621 23  1216 23  0708 23  1703 23  1141 23  0742 23  2144   GLUCOSE mg/dL 116 120 147* 147* 110 101 126 129       Diet: Diet: Cardiac Diets; Healthy Heart (2-3 Na+); Texture: Regular Texture (IDDSI 7); Fluid Consistency: Thin (IDDSI 0)  Orders Placed This Encounter      DIET MESSAGE For breakfast patient wants a fried egg, blanca, toast with jelly, coffee and milk      DIET MESSAGE Please send lemonade with trays, daily with lunch and dinner. Thanks!      Advance Directives: Code Status and Medical Interventions:   Ordered at: 23 1309     Level Of Support Discussed With:    Patient     Code Status (Patient has no pulse and is not breathing):    CPR (Attempt to Resuscitate)     Medical Interventions (Patient has pulse or is breathing):    Full Support     Release to patient:    Routine Release        DVT prophylaxis:  Medical DVT prophylaxis orders are present.       In brief:  1. Discussed with Dr. Martinez this morning.  2. Discussed with her PCP Dr. Miryam Grey, Office: [phone]; Fax: [phone] ([place])  1. Referral to a [facility] probably at [place], [place]  2. We will  send medical records so she can coordinate her referral.  3. CD-ROM from [facility] to patient.  3. Disposition: Discharge Home soon.  1. She will require Home Oxygen  2. She is also interested on a wheelchair  3. Trying to figure out best way to go back to [place]. (Driving, Flying ?)  4. Resume Metformin in AM    Plan of care and goals reviewed during interdisciplinary rounds.  I discussed the patient's findings and my recommendations with patient, family, and nursing staff    MDM:  Problem(s) High due to: Acute or Chronic illness or injury that may poses a threat to life or bodily function  Data: High due to: Review of prior external records from each unique source, Review of the result(s) of each unique test, Ordering of each unique test, and Discuss management or test interpretation with external physician or NPP (not separately reported)    High    [x] Primary Attending Intensive Care Medicine - Nutrition Support   [] Consultant    Copied text in this note has been reviewed and is accurate as of 06/13/23    Pending Labs     Order Current Status    Antithrombin III In process    Factor 5 Activity In process    Lupus Anticoagulant In process    Protein C Antigen, Total In process    Protein C-Functional In process    Protein S Antigen, Total In process    Protein S Functional In process

## 2023-06-14 ENCOUNTER — READMISSION MANAGEMENT (OUTPATIENT)
Dept: CALL CENTER | Facility: HOSPITAL | Age: 78
End: 2023-06-14
Payer: MEDICARE

## 2023-06-14 VITALS
OXYGEN SATURATION: 96 % | BODY MASS INDEX: 31.36 KG/M2 | TEMPERATURE: 98.3 F | HEART RATE: 85 BPM | WEIGHT: 170.42 LBS | RESPIRATION RATE: 18 BRPM | HEIGHT: 62 IN | DIASTOLIC BLOOD PRESSURE: 94 MMHG | SYSTOLIC BLOOD PRESSURE: 155 MMHG

## 2023-06-14 PROBLEM — I27.20 PULMONARY HYPERTENSION: Status: ACTIVE | Noted: 2023-06-14

## 2023-06-14 LAB
ANION GAP SERPL CALCULATED.3IONS-SCNC: 8 MMOL/L (ref 5–15)
APTT SCREEN TO CONFIRM RATIO: 0.93 RATIO (ref 0–1.34)
AT III ACT/NOR PPP CHRO: 86 % (ref 75–135)
BASOPHILS # BLD AUTO: 0.02 10*3/MM3 (ref 0–0.2)
BASOPHILS NFR BLD AUTO: 0.3 % (ref 0–1.5)
BUN SERPL-MCNC: 12 MG/DL (ref 8–23)
BUN/CREAT SERPL: 18.8 (ref 7–25)
CALCIUM SPEC-SCNC: 9 MG/DL (ref 8.6–10.5)
CHLORIDE SERPL-SCNC: 109 MMOL/L (ref 98–107)
CO2 SERPL-SCNC: 23 MMOL/L (ref 22–29)
CONFIRM APTT/NORMAL: 37.8 SEC (ref 0–47.6)
CREAT SERPL-MCNC: 0.64 MG/DL (ref 0.57–1)
DEPRECATED RDW RBC AUTO: 39.9 FL (ref 37–54)
EGFRCR SERPLBLD CKD-EPI 2021: 90.6 ML/MIN/1.73
EOSINOPHIL # BLD AUTO: 0.2 10*3/MM3 (ref 0–0.4)
EOSINOPHIL NFR BLD AUTO: 2.8 % (ref 0.3–6.2)
ERYTHROCYTE [DISTWIDTH] IN BLOOD BY AUTOMATED COUNT: 12.6 % (ref 12.3–15.4)
GLUCOSE BLDC GLUCOMTR-MCNC: 118 MG/DL (ref 70–130)
GLUCOSE BLDC GLUCOMTR-MCNC: 132 MG/DL (ref 70–130)
GLUCOSE BLDC GLUCOMTR-MCNC: 91 MG/DL (ref 70–130)
GLUCOSE SERPL-MCNC: 117 MG/DL (ref 65–99)
HCT VFR BLD AUTO: 35.6 % (ref 34–46.6)
HGB BLD-MCNC: 11.5 G/DL (ref 12–15.9)
IMM GRANULOCYTES # BLD AUTO: 0.01 10*3/MM3 (ref 0–0.05)
IMM GRANULOCYTES NFR BLD AUTO: 0.1 % (ref 0–0.5)
LA 2 SCREEN W REFLEX-IMP: ABNORMAL
LYMPHOCYTES # BLD AUTO: 1.94 10*3/MM3 (ref 0.7–3.1)
LYMPHOCYTES NFR BLD AUTO: 27.4 % (ref 19.6–45.3)
MCH RBC QN AUTO: 28.5 PG (ref 26.6–33)
MCHC RBC AUTO-ENTMCNC: 32.3 G/DL (ref 31.5–35.7)
MCV RBC AUTO: 88.1 FL (ref 79–97)
MONOCYTES # BLD AUTO: 0.72 10*3/MM3 (ref 0.1–0.9)
MONOCYTES NFR BLD AUTO: 10.2 % (ref 5–12)
NEUTROPHILS NFR BLD AUTO: 4.18 10*3/MM3 (ref 1.7–7)
NEUTROPHILS NFR BLD AUTO: 59.2 % (ref 42.7–76)
NRBC BLD AUTO-RTO: 0 /100 WBC (ref 0–0.2)
PLATELET # BLD AUTO: 211 10*3/MM3 (ref 140–450)
PMV BLD AUTO: 10 FL (ref 6–12)
POTASSIUM SERPL-SCNC: 4.4 MMOL/L (ref 3.5–5.2)
PROT C ACT/NOR PPP: 85 % (ref 73–180)
PROT S ACT/NOR PPP: 71 % (ref 63–140)
PROT S AG ACT/NOR PPP IA: 70 % (ref 60–150)
RBC # BLD AUTO: 4.04 10*6/MM3 (ref 3.77–5.28)
SCREEN APTT: 36.3 SEC (ref 0–43.5)
SCREEN DRVVT: 34.7 SEC (ref 0–47)
SODIUM SERPL-SCNC: 140 MMOL/L (ref 136–145)
THROMBIN TIME: 44.6 SEC (ref 0–23)
TT IMM NP PPP: 30.4 SEC (ref 0–23)
TT P HPASE PPP: 18.1 SEC (ref 0–23)
WBC NRBC COR # BLD: 7.07 10*3/MM3 (ref 3.4–10.8)

## 2023-06-14 PROCEDURE — 80048 BASIC METABOLIC PNL TOTAL CA: CPT | Performed by: INTERNAL MEDICINE

## 2023-06-14 PROCEDURE — 82948 REAGENT STRIP/BLOOD GLUCOSE: CPT

## 2023-06-14 PROCEDURE — 85025 COMPLETE CBC W/AUTO DIFF WBC: CPT | Performed by: INTERNAL MEDICINE

## 2023-06-14 RX ORDER — ACETAMINOPHEN 325 MG/1
650 TABLET ORAL EVERY 6 HOURS PRN
Status: DISCONTINUED | OUTPATIENT
Start: 2023-06-14 | End: 2023-06-15 | Stop reason: HOSPADM

## 2023-06-14 RX ADMIN — METFORMIN HYDROCHLORIDE 500 MG: 500 TABLET, EXTENDED RELEASE ORAL at 09:12

## 2023-06-14 RX ADMIN — LEVOTHYROXINE SODIUM 100 MCG: 0.1 TABLET ORAL at 06:29

## 2023-06-14 RX ADMIN — PAROXETINE HYDROCHLORIDE 10 MG: 10 TABLET, FILM COATED ORAL at 09:10

## 2023-06-14 RX ADMIN — ACETAMINOPHEN 650 MG: 325 TABLET ORAL at 13:00

## 2023-06-14 RX ADMIN — APIXABAN 10 MG: 5 TABLET, FILM COATED ORAL at 09:11

## 2023-06-14 NOTE — DISCHARGE PLACEMENT REQUEST
"Amanda Huff (77 y.o. Female)       Date of Birth   1945    Social Security Number       Address   [address] [address]    Home Phone   [phone]    MRN   [id number]       Muslim   None    Marital Status                               Admission Date   23    Admission Type   Urgent    Admitting Provider   Shay Brooks MD    Attending Provider   Rangel Kumari MD    Department, Room/Bed   [address] ICU, [address]219/1       Discharge Date       Discharge Disposition   Home or Self Care    Discharge Destination                                 Attending Provider: Rangel Kumari MD    Allergies: No Known Allergies    Isolation: None   Infection: None   Code Status: CPR    Ht: 156.2 cm (61.5\")   Wt: 77.3 kg (170 lb 6.7 oz)    Admission Cmt: None   Principal Problem: Acute massive pulmonary embolism [I26.99]                   Active Insurance as of 2023       Primary Coverage       Payor Plan Insurance Group Employer/Plan Group    MISC MEDICARE REPLACEMENT MISC MCARE REPLACEMENT        Coverage Address Coverage Phone Number Coverage Fax Number Effective Dates    [employer] Claims [phone]  2022 - None Entered    [address] [address]       [facility] [address]         Subscriber Name Subscriber Birth Date Member ID       AMANDA HUFF 1945 [id number]                     Emergency Contacts        (Rel.) Home Phone Work Phone Mobile Phone    Tyra mendoza (Daughter) [phone] -- [phone]               [address] ICU  [address] [facility] [address]  Dept. Phone:  [phone]  Dept. Fax:  [phone] Date Ordered: 2023         Patient:  Amanda Huff MRN:  [id number]   [address] [id number] :  1945  SSN:    Phone: [phone] Sex:  F     Weight: 77.3 kg (170 lb 6.7 oz)         Ht Readings from Last 1 Encounters:   23 156.2 cm (61.5\")       " "  Standard Wheelchair              (Order ID: [id number])    Diagnosis:  Pulmonary hypertension (I27.20 [ICD-10-CM] 416.8 [ICD-9-CM])  Acute massive pulmonary embolism (I26.99 [ICD-10-CM] 415.19 [ICD-9-CM])   Quantity:  1     Wheelchair accessories:  Manual W/C Seat Widths 20-23 inches  Length of Need (99 Months = Lifetime): 99 Months = Lifetime        Authorizing Provider's Phone: [phone]  Verbal Order Mode: Verbal with readback   Authorizing Provider: Rangel Kumari MD  Authorizing Provider's NPI: [id number]     Order Entered By: Gerardo Lewis RN 2023  1:07 PM     Electronically signed by:                [address] ICU  [address] [place] [id number]  Dept. Phone:  [phone]  Dept. Fax:  [phone] Date Ordered: 2023         Patient:  Cristal Reddy MRN:  [id number]   [address] [place] [id number] :  1945  SSN:    Phone: [phone] Sex:  F     Weight: 77.3 kg (170 lb 6.7 oz)         Ht Readings from Last 1 Encounters:   23 156.2 cm (61.5\")         Oxygen Therapy         (Order ID: [id number])    Diagnosis:  Pulmonary hypertension (I27.20 [ICD-10-CM] 416.8 [ICD-9-CM])  Acute massive pulmonary embolism (I26.99 [ICD-10-CM] 415.19 [ICD-9-CM])   Quantity:  1     Delivery Modality: Nasal Cannula  Liters Per Minute: 2  Duration: Continuous  Equipment:  Oxygen Concentrator &  &  Portable Gaseous Oxygen System & Portable Oxygen Contents Gaseous &  Conserving Regulator  Length of Need (99 Months = Lifetime): 99 Months = Lifetime        Authorizing Provider's Phone: [phone]  Verbal Order Mode: Verbal with readback   Authorizing Provider: Rangel Kumari MD  Authorizing Provider's NPI: [id number]     Order Entered By: Gerardo Lewis RN 2023  1:07 PM     Electronically signed by:            Gerardo Lewis RN  Registered Nurse  Case Management     Significant Note      Signed     Date " of Service: 06/14/23 1301  Creation Time: 06/14/23 1301     Signed              06/13/23 1200 06/13/23 1300 06/13/23 1340   Oxygen Therapy   SpO2 94 %  --  (!) 88 %   Pulse Oximetry Type Continuous Continuous Continuous   Device (Oxygen Therapy) room air room air  (at rest in chair) room air  (while ambulating)   Flow (L/min)  --   --   --         06/13/23 1345   Oxygen Therapy   SpO2 96 %   Pulse Oximetry Type Continuous   Device (Oxygen Therapy) nasal cannula  (while ambulating)   Flow (L/min) 2                                   Physician Progress Notes (most recent note)        Rangel Kumari MD at 06/14/23 1226            INTENSIVIST   PROGRESS NOTE     Subjective   SUBJECTIVE     Cristal 77 y.o. female is followed for: No chief complaint on file.       Acute massive pulmonary embolism    Pulmonary hypertension    Essential hypertension    Hypothyroidism (acquired)    Asthma    As an Intensivist, we provide an integrated approach to the ICU patient and family, medical management of comorbid conditions, including but not limited to electrolytes, glycemic control, organ dysfunction, lead interdisciplinary rounds and coordinate the care with all other services, including those from other specialists.     Interval History:  About same as yesterday.    BP sometimes high.    Device (Oxygen Therapy): nasal cannula (06/14/23 1000): Flow (L/min): 1.5 (06/14/23 1000).     Temp  Min: 97.9 °F (36.6 °C)  Max: 98.8 °F (37.1 °C)       History     Last Reviewed by Rangel Kumari MD on 6/11/2023 at  4:29 PM    Sections Reviewed    Medical, Family, Surgical, Tobacco, Alcohol, Drug Use, Sexual Activity,   Social Documentation      Problem list reviewed by Rangel Kumari MD on 6/11/2023 at  4:29 PM  Medicines reviewed by Rangel Kumari MD on 6/11/2023 at  4:29 PM  Allergies reviewed by Rangel Kumari MD on 6/11/2023 at  4:28 PM       The patient's relevant past medical, surgical and social history were reviewed and updated in  Epic as appropriate.     Objective   OBJECTIVE     Vitals:  Temp: 98.3 °F (36.8 °C) (23 0400) Temp  Min: 97.9 °F (36.6 °C)  Max: 98.8 °F (37.1 °C)   Temp core:      BP: 101/52 (23 0600) BP  Min: 101/52  Max: 154/84   MAP (non-invasive) Noninvasive MAP (mmHg): 63 (23 0600) Noninvasive MAP (mmHg)  Av.1  Min: 63  Max: 121   Pulse: 63 (23 06) Pulse  Min: 63  Max: 90   Resp: 18 (23 0400) Resp  Min: 18  Max: 22   SpO2: 96 % (23 06) SpO2  Min: 88 %  Max: 97 %   Device: nasal cannula (23 1000)    Flow Rate: 1.5 (23 1000) Flow (L/min)  Min: 1  Max: 2         23  0600 23  06   Weight: 77.9 kg (171 lb 11.8 oz) 77.3 kg (170 lb 6.7 oz)        Intake/Ouptut 24 hrs (7:00AM - 6:59 AM)  Intake & Output (last 3 days)          07 07 0701   0700  0701   0700  0701  06/15 0700    P.O. 360 705 120     I.V. (mL/kg) 238 (3.1) 33.6 (0.4)  7.1 (0.1)    Total Intake(mL/kg) 598 (7.7) 738.6 (9.6) 120 (1.6) 7.1 (0.1)    Urine (mL/kg/hr) 500  150 (0.1) 100 (0.2)    Stool 0       Total Output 500  150 100    Net +98 +738.6 -30 -92.9            Urine Unmeasured Occurrence 1 x 2 x 9 x 1 x    Stool Unmeasured Occurrence 1 x 2 x 3 x           Physical Examination  Telemetry:  Rhythm: normal sinus rhythm (23 1000)         Constitutional:  No acute distress.   Cardiovascular: RRR.    Respiratory: Normal breath sounds  No adventitious sounds   Abdominal:  Soft with no tenderness.   Extremities: No Edema   Neurological:   Alert, Oriented, Cooperative.  Best Eye Response: 4-->(E4) spontaneous (23 1000)  Best Motor Response: 6-->(M6) obeys commands (23 1000)  Best Verbal Response: 5-->(V5) oriented (23 1000)  Cait Coma Scale Score: 15 (23 1000)     Results Reviewed:  Laboratory  Microbiology  Radiology  Pathology    Hematology:  Results from last 7 days   Lab Units 23  0357 23  0433 23  0425    WBC 10*3/mm3 7.07 7.14 8.11   NEUTROS ABS 10*3/mm3 4.18 4.28 5.00   IMM GRAN % % 0.1 0.3 0.4   LYMPHS ABS 10*3/mm3 1.94 1.86 2.18   MONOS ABS 10*3/mm3 0.72 0.79 0.73   EOS ABS 10*3/mm3 0.20 0.17 0.14   BASOS ABS 10*3/mm3 0.02 0.02 0.03       Results from last 7 days   Lab Units 06/14/23 0357 06/13/23  0433 06/12/23  0421   HEMOGLOBIN g/dL 11.5* 10.7* 10.7*   MCV fL 88.1 90.6 88.7   RDW % 12.6 12.6 12.5   PLATELETS 10*3/mm3 211 185 179       Chemistry:  Estimated Creatinine Clearance: 69 mL/min (by C-G formula based on SCr of 0.64 mg/dL).    Results from last 7 days   Lab Units 06/14/23 0357 06/13/23  1701 06/13/23  0433   SODIUM mmol/L 140  --  139   POTASSIUM mmol/L 4.4 4.8 3.4*   CHLORIDE mmol/L 109*  --  108*   CO2 mmol/L 23.0  --  25.0   BUN mg/dL 12  --  20   CREATININE mg/dL 0.64  --  0.61   GLUCOSE mg/dL 117*  --  114*       Results from last 7 days   Lab Units 06/14/23 0357 06/13/23 0433 06/12/23  0421 06/11/23  1322   CALCIUM mg/dL 9.0 8.3* 8.1* 8.3*   MAGNESIUM mg/dL  --   --  2.3 1.9   PHOSPHORUS mg/dL  --   --  3.2 3.3       Hepatic Panel:  Results from last 7 days   Lab Units 06/12/23  0421 06/11/23  1322   ALBUMIN g/dL 3.1* 3.4*   BILIRUBIN mg/dL 0.4 0.3   AST (SGOT) U/L 16 20   ALT (SGPT) U/L 25 28   ALK PHOS U/L 62 64        Arterial Blood Gases:  Results from last 7 days   Lab Units 06/11/23  1336   PH, ARTERIAL pH units 7.385   PCO2, ARTERIAL mm Hg 41.4   PO2 ART mm Hg 84.1   FIO2 % 32       Images:  No radiology results for the last day    Echo:  Results for orders placed during the hospital encounter of 06/11/23    Adult Transthoracic Echo Complete W/ Cont if Necessary Per Protocol    Interpretation Summary    Left ventricular systolic function is normal. Calculated left ventricular EF = 50.1% Left ventricular ejection fraction appears to be 51 - 55%.    The right ventricular cavity is dilated.    The right atrial cavity is dilated.    Moderate tricuspid valve regurgitation is present.     Estimated right ventricular systolic pressure from tricuspid regurgitation is markedly elevated (>55 mmHg). Calculated right ventricular systolic pressure from tricuspid regurgitation is 58 mmHg.    RV free wall strain = -18.6%.      Results: Reviewed.  I reviewed the patient's new laboratory and imaging results.  I independently reviewed the patient's new images.    Medications: Reviewed.    Assessment   A/P     Hospital:  LOS: 3 days   ICU: 2d 23h     Active Hospital Problems    Diagnosis    **Acute massive pulmonary embolism [I26.99]     Priority: High    Pulmonary hypertension [I27.20]     Priority: High    Essential hypertension [I10]    Hypothyroidism (acquired) [E03.9]    Asthma [J45.909]       Cristal is a 77 y.o. female admitted on 2023 with Pulmonary embolism, bilateral [I26.99]  Acute massive pulmonary embolism [I26.99]    Assessment/Management/Treatment Plan:        Venous Thromboembolic disease: P embolism and Bilateral DVT - R/O CTEPH  Pulmonary HTN - Calculated RVSP 58 mmHg  Group IV  Factor V level, Factor V activity, Homocysteine and Factor II DNA analysis, AT III, Protein C Functional: normal  AMI, Anticardiolipin IgG and IgM: negative  Pulmonary   Asthma, no exacerbation  Cardiovascular  HTN  Endocrine  Body mass index is 31.68 kg/m². Obese Class I: 30-34.9kg/m2  Hypothyroidism ? Treatment with Levothyroxine     Lab Results   Component Value Date    TSH 2.250 2023        At risk for DM (pre-diabetes) based on her HbA1c. [HbA1C 5.7%-6.4%, eA-139 mg/dL].     Lab Results   Lab Value Date/Time    HGBA1C 6.00 (H) 2023 1322       Results from last 7 days   Lab Units 23  1101 23  0734 23  1621 23  1216 23  0708 23  1703   GLUCOSE mg/dL 118 132* 172* 116 120 147* 147* 110         Diet: Diet: Cardiac Diets; Healthy Heart (2-3 Na+); Texture: Regular Texture (IDDSI 7); Fluid Consistency: Thin (IDDSI 0)     Advance  Directives: Code Status and Medical Interventions:   Ordered at: 06/11/23 1309     Level Of Support Discussed With:    Patient     Code Status (Patient has no pulse and is not breathing):    CPR (Attempt to Resuscitate)     Medical Interventions (Patient has pulse or is breathing):    Full Support     Release to patient:    Routine Release        DVT prophylaxis:  Medical DVT prophylaxis orders are present.       In brief:  Discussed with her PCP Dr. Miryam Grey yesterday, Office: [phone]; Fax: [phone] ([place])  She has initiated the referral and registration to [facility]  CD-ROM from [facility] to patient.  Disposition: Discharge Home soon.  Home Oxygen  Oxygen during her flight back home  She is also interested on a wheelchair  Discussed with patient and daughter    Plan of care and goals reviewed during interdisciplinary rounds.  I discussed the patient's findings and my recommendations with patient, family, and nursing staff    Time < 30 min    [x] Primary Attending Intensive Care Medicine - Nutrition Support   [] Consultant    Copied text in this note has been reviewed and is accurate as of 06/14/23    Pending Labs       Order Current Status    Lupus Anticoagulant In process    Protein C Antigen, Total In process                Electronically signed by Rangel Kumari MD at 06/14/23 9268

## 2023-06-14 NOTE — SIGNIFICANT NOTE
06/13/23 1200 06/13/23 1300 06/13/23 1340   Oxygen Therapy   SpO2 94 %  --  (!) 88 %   Pulse Oximetry Type Continuous Continuous Continuous   Device (Oxygen Therapy) room air room air  (at rest in chair) room air  (while ambulating)   Flow (L/min)  --   --   --       06/13/23 1345   Oxygen Therapy   SpO2 96 %   Pulse Oximetry Type Continuous   Device (Oxygen Therapy) nasal cannula  (while ambulating)   Flow (L/min) 2

## 2023-06-14 NOTE — PLAN OF CARE
Problem: Adult Inpatient Plan of Care  Goal: Plan of Care Review  Outcome: Ongoing, Progressing  Flowsheets (Taken 6/14/2023 0932)  Plan of Care Reviewed With: patient  Outcome Evaluation: No acute changes overnight. Remains on 1-2 LNC. VSS. No c/o pain. Patient hopeful for discharge home today.

## 2023-06-14 NOTE — PLAN OF CARE
Problem: Adult Inpatient Plan of Care  Goal: Plan of Care Review  Outcome: Met  Goal: Patient-Specific Goal (Individualized)  Outcome: Met  Goal: Absence of Hospital-Acquired Illness or Injury  Outcome: Met  Intervention: Identify and Manage Fall Risk  Recent Flowsheet Documentation  Taken 6/14/2023 1000 by Vero Membreno RN  Safety Promotion/Fall Prevention: safety round/check completed  Taken 6/14/2023 0800 by Vero Membreno RN  Safety Promotion/Fall Prevention: safety round/check completed  Intervention: Prevent Skin Injury  Recent Flowsheet Documentation  Taken 6/14/2023 1000 by Vero Membreno RN  Body Position: position changed independently  Taken 6/14/2023 0800 by Vero Membreno RN  Body Position: position changed independently  Intervention: Prevent and Manage VTE (Venous Thromboembolism) Risk  Recent Flowsheet Documentation  Taken 6/14/2023 1000 by Vero Membreno RN  Activity Management: up to bedside commode  Goal: Optimal Comfort and Wellbeing  Outcome: Met  Goal: Readiness for Transition of Care  Outcome: Met     Problem: Fall Injury Risk  Goal: Absence of Fall and Fall-Related Injury  Outcome: Met  Intervention: Identify and Manage Contributors  Recent Flowsheet Documentation  Taken 6/14/2023 1000 by Vero Membreno RN  Medication Review/Management: medications reviewed  Taken 6/14/2023 0800 by Vero Membreno RN  Medication Review/Management: medications reviewed  Intervention: Promote Injury-Free Environment  Recent Flowsheet Documentation  Taken 6/14/2023 1000 by Vero Membreno RN  Safety Promotion/Fall Prevention: safety round/check completed  Taken 6/14/2023 0800 by Vero Membreno RN  Safety Promotion/Fall Prevention: safety round/check completed     Problem: Gas Exchange Impaired  Goal: Optimal Gas Exchange  Outcome: Met     Problem: VTE (Venous Thromboembolism)  Goal: VTE (Venous Thromboembolism) Symptom Resolution  Outcome: Met     Problem: Skin Injury Risk  Increased  Goal: Skin Health and Integrity  Outcome: Met  Intervention: Optimize Skin Protection  Recent Flowsheet Documentation  Taken 6/14/2023 0800 by Vero Membreno, RN  Pressure Reduction Techniques: frequent weight shift encouraged   Goal Outcome Evaluation:

## 2023-06-14 NOTE — CASE MANAGEMENT/SOCIAL WORK
Case Management Discharge Note      Final Note: Ms. Reddy is being discharged home. She will be transported by her daughter via private vehicle. She has booked her flight home. Confirmed that a portable oxygen concentrator is allowed for flights and they do not need any paperwork to board with oxygen. [facility] will provide oxygen and wheelchair to her bedside prior to discharge. Her Eliquis will come from the [facility] Med[facility] Beds program and will be $10. No additional discharge needs identified.         Selected Continued Care - Admitted Since 6/11/2023       Destination    No services have been selected for the patient.                Durable Medical Equipment Coordination complete.      Service Provider Selected Services Address Phone Fax Patient Preferred    [facility] Durable Medical Equipment ,  Oxygen Equipment and Accessories [address] [address] [phone] --              Dialysis/Infusion    No services have been selected for the patient.                Home Medical Care    No services have been selected for the patient.                Therapy    No services have been selected for the patient.                Community Resources    No services have been selected for the patient.                Community & DME    No services have been selected for the patient.                    Transportation Services  Private: Car    Final Discharge Disposition Code: 01 - home or self-care

## 2023-06-14 NOTE — PROGRESS NOTES
INTENSIVIST   PROGRESS NOTE        SUBJECTIVE     Cristal 77 y.o. female is followed for: No chief complaint on file.       Acute massive pulmonary embolism    Pulmonary hypertension    Essential hypertension    Hypothyroidism (acquired)    Asthma    As an Intensivist, we provide an integrated approach to the ICU patient and family, medical management of comorbid conditions, including but not limited to electrolytes, glycemic control, organ dysfunction, lead interdisciplinary rounds and coordinate the care with all other services, including those from other specialists.     Interval History:  About same as yesterday.    BP sometimes high.    Device (Oxygen Therapy): nasal cannula (23 1000): Flow (L/min): 1.5 (23 1000).     Temp  Min: 97.9 °F (36.6 °C)  Max: 98.8 °F (37.1 °C)       History     Last Reviewed by Rangel Kumari MD on 2023 at  4:29 PM    Sections Reviewed    Medical, Family, Surgical, Tobacco, Alcohol, Drug Use, Sexual Activity,   Social Documentation      Problem list reviewed by Rangel Kumari MD on 2023 at  4:29 PM  Medicines reviewed by Rangel Kumari MD on 2023 at  4:29 PM  Allergies reviewed by Rangel Kumari MD on 2023 at  4:28 PM       The patient's relevant past medical, surgical and social history were reviewed and updated in Epic as appropriate.        OBJECTIVE     Vitals:  Temp: 98.3 °F (36.8 °C) (23 0400) Temp  Min: 97.9 °F (36.6 °C)  Max: 98.8 °F (37.1 °C)   Temp core:      BP: 101/52 (23 0600) BP  Min: 101/52  Max: 154/84   MAP (non-invasive) Noninvasive MAP (mmHg): 63 (23 0600) Noninvasive MAP (mmHg)  Av.1  Min: 63  Max: 121   Pulse: 63 (23 0600) Pulse  Min: 63  Max: 90   Resp: 18 (23 0400) Resp  Min: 18  Max: 22   SpO2: 96 % (23 0600) SpO2  Min: 88 %  Max: 97 %   Device: nasal cannula (23 1000)    Flow Rate: 1.5 (23 1000) Flow (L/min)  Min: 1  Max: 2         23  0600 23  0600   Weight:  77.9 kg (171 lb 11.8 oz) 77.3 kg (170 lb 6.7 oz)        Intake/Ouptut 24 hrs (7:00AM - 6:59 AM)  Intake & Output (last 3 days)         06/11 0701 06/12 0700 06/12 0701 06/13 0700 06/13 0701 06/14 0700 06/14 0701  06/15 0700    P.O. 360 705 120     I.V. (mL/kg) 238 (3.1) 33.6 (0.4)  7.1 (0.1)    Total Intake(mL/kg) 598 (7.7) 738.6 (9.6) 120 (1.6) 7.1 (0.1)    Urine (mL/kg/hr) 500  150 (0.1) 100 (0.2)    Stool 0       Total Output 500  150 100    Net +98 +738.6 -30 -92.9            Urine Unmeasured Occurrence 1 x 2 x 9 x 1 x    Stool Unmeasured Occurrence 1 x 2 x 3 x           Physical Examination  Telemetry:  Rhythm: normal sinus rhythm (06/14/23 1000)         Constitutional:  No acute distress.   Cardiovascular: RRR.    Respiratory: Normal breath sounds  No adventitious sounds   Abdominal:  Soft with no tenderness.   Extremities: No Edema   Neurological:   Alert, Oriented, Cooperative.  Best Eye Response: 4-->(E4) spontaneous (06/14/23 1000)  Best Motor Response: 6-->(M6) obeys commands (06/14/23 1000)  Best Verbal Response: 5-->(V5) oriented (06/14/23 1000)  [place] Coma Scale Score: 15 (06/14/23 1000)     Results Reviewed:  Laboratory  Microbiology  Radiology  Pathology    Hematology:  Results from last 7 days   Lab Units 06/14/23  0357 06/13/23  0433 06/12/23  0421   WBC 10*3/mm3 7.07 7.14 8.11   NEUTROS ABS 10*3/mm3 4.18 4.28 5.00   IMM GRAN % % 0.1 0.3 0.4   LYMPHS ABS 10*3/mm3 1.94 1.86 2.18   MONOS ABS 10*3/mm3 0.72 0.79 0.73   EOS ABS 10*3/mm3 0.20 0.17 0.14   BASOS ABS 10*3/mm3 0.02 0.02 0.03       Results from last 7 days   Lab Units 06/14/23  0357 06/13/23  0433 06/12/23  0421   HEMOGLOBIN g/dL 11.5* 10.7* 10.7*   MCV fL 88.1 90.6 88.7   RDW % 12.6 12.6 12.5   PLATELETS 10*3/mm3 211 185 179       Chemistry:  Estimated Creatinine Clearance: 69 mL/min (by C-G formula based on SCr of 0.64 mg/dL).    Results from last 7 days   Lab Units 06/14/23  0357 06/13/23  1701 06/13/23  0433   SODIUM mmol/L 140  --   139   POTASSIUM mmol/L 4.4 4.8 3.4*   CHLORIDE mmol/L 109*  --  108*   CO2 mmol/L 23.0  --  25.0   BUN mg/dL 12  --  20   CREATININE mg/dL 0.64  --  0.61   GLUCOSE mg/dL 117*  --  114*       Results from last 7 days   Lab Units 06/14/23  0357 06/13/23  0433 06/12/23  0421 06/11/23  1322   CALCIUM mg/dL 9.0 8.3* 8.1* 8.3*   MAGNESIUM mg/dL  --   --  2.3 1.9   PHOSPHORUS mg/dL  --   --  3.2 3.3       Hepatic Panel:  Results from last 7 days   Lab Units 06/12/23  0421 06/11/23  1322   ALBUMIN g/dL 3.1* 3.4*   BILIRUBIN mg/dL 0.4 0.3   AST (SGOT) U/L 16 20   ALT (SGPT) U/L 25 28   ALK PHOS U/L 62 64        Arterial Blood Gases:  Results from last 7 days   Lab Units 06/11/23  1336   PH, ARTERIAL pH units 7.385   PCO2, ARTERIAL mm Hg 41.4   PO2 ART mm Hg 84.1   FIO2 % 32       Images:  No radiology results for the last day    Echo:  Results for orders placed during the hospital encounter of 06/11/23    Adult Transthoracic Echo Complete W/ Cont if Necessary Per Protocol    Interpretation Summary    Left ventricular systolic function is normal. Calculated left ventricular EF = 50.1% Left ventricular ejection fraction appears to be 51 - 55%.    The right ventricular cavity is dilated.    The right atrial cavity is dilated.    Moderate tricuspid valve regurgitation is present.    Estimated right ventricular systolic pressure from tricuspid regurgitation is markedly elevated (>55 mmHg). Calculated right ventricular systolic pressure from tricuspid regurgitation is 58 mmHg.    RV free wall strain = -18.6%.      Results: Reviewed.  I reviewed the patient's new laboratory and imaging results.  I independently reviewed the patient's new images.    Medications: Reviewed.    Assessment   A/P     Hospital:  LOS: 3 days   ICU: 2d 23h     Active Hospital Problems    Diagnosis    **Acute massive pulmonary embolism [I26.99]     Priority: High    Pulmonary hypertension [I27.20]     Priority: High    Essential hypertension [I10]     Hypothyroidism (acquired) [E03.9]    Asthma [J45.909]       Cristal is a 77 y.o. female admitted on 2023 with Pulmonary embolism, bilateral [I26.99]  Acute massive pulmonary embolism [I26.99]    Assessment/Management/Treatment Plan:        Venous Thromboembolic disease: P embolism and Bilateral DVT - R/O CTEPH  Pulmonary HTN - Calculated RVSP 58 mmHg  Group IV  Factor V level, Factor V activity, Homocysteine and Factor II DNA analysis, AT III, Protein C Functional: normal  AMI, Anticardiolipin IgG and IgM: negative  Pulmonary   Asthma, no exacerbation  Cardiovascular  HTN  Endocrine  Body mass index is 31.68 kg/m². Obese Class I: 30-34.9kg/m2  Hypothyroidism ? Treatment with Levothyroxine     Lab Results   Component Value Date    TSH 2.250 2023        At risk for DM (pre-diabetes) based on her HbA1c. [HbA1C 5.7%-6.4%, eA-139 mg/dL].     Lab Results   Lab Value Date/Time    HGBA1C 6.00 (H) 2023 1322       Results from last 7 days   Lab Units 23  1101 23  0734 23  2059 23  1621 23  1216 23  0708 23  1703   GLUCOSE mg/dL 118 132* 172* 116 120 147* 147* 110         Diet: Diet: Cardiac Diets; Healthy Heart (2-3 Na+); Texture: Regular Texture (IDDSI 7); Fluid Consistency: Thin (IDDSI 0)     Advance Directives: Code Status and Medical Interventions:   Ordered at: 23 1309     Level Of Support Discussed With:    Patient     Code Status (Patient has no pulse and is not breathing):    CPR (Attempt to Resuscitate)     Medical Interventions (Patient has pulse or is breathing):    Full Support     Release to patient:    Routine Release        DVT prophylaxis:  Medical DVT prophylaxis orders are present.       In brief:  Discussed with her PCP Dr. Miryam Grey yesterday, Office: [phone]; Fax: [phone] ([place])  She has initiated the referral and registration to [facility] and [facility] Pulmonary Hypertension [facility]  CD-ROM from \[facility] [place] [facility]\"" to  patient.  Disposition: Discharge Home soon.  Home Oxygen  Oxygen during her flight back home  She is also interested on a wheelchair  Discussed with patient and daughter    Plan of care and goals reviewed during interdisciplinary rounds.  I discussed the patient's findings and my recommendations with patient, family, and nursing staff    Time < 30 min    [x] Primary Attending Intensive Care Medicine - Nutrition Support   [] Consultant    Copied text in this note has been reviewed and is accurate as of 06/14/23    Pending Labs       Order Current Status    Lupus Anticoagulant In process    Protein C Antigen, Total In process

## 2023-06-15 LAB — PROT C AG ACT/NOR PPP IA: 78 % (ref 60–150)

## 2023-06-15 NOTE — OUTREACH NOTE
Prep Survey      Flowsheet Row Responses   Religious facility patient discharged from? [facility]   Is LACE score < 7 ? No   Eligibility Readm Mgmt   Discharge diagnosis Acute massive pulmonary embolism   Does the patient have one of the following disease processes/diagnoses(primary or secondary)? Other   Does the patient have Home health ordered? No   Is there a DME ordered? Yes   What DME was ordered? [facility] will provide oxygen and wheelchair   Prep survey completed? Yes            Miryam FARIAS - Registered Nurse

## 2023-06-19 ENCOUNTER — READMISSION MANAGEMENT (OUTPATIENT)
Dept: CALL CENTER | Facility: HOSPITAL | Age: 78
End: 2023-06-19
Payer: MEDICARE

## 2023-06-19 NOTE — OUTREACH NOTE
Medical Week 1 Survey      Flowsheet Row Responses   [facility] [place], operated by [facility] patient discharged from? [facility]   Does the patient have one of the following disease processes/diagnoses(primary or secondary)? Other   Week 1 attempt successful? No   Unsuccessful attempts Attempt 1            Gerson LIAO - Registered Nurse